# Patient Record
Sex: FEMALE | Race: OTHER | HISPANIC OR LATINO | Employment: FULL TIME | URBAN - METROPOLITAN AREA
[De-identification: names, ages, dates, MRNs, and addresses within clinical notes are randomized per-mention and may not be internally consistent; named-entity substitution may affect disease eponyms.]

---

## 2024-11-08 ENCOUNTER — ANCILLARY PROCEDURE (OUTPATIENT)
Dept: OBGYN | Facility: CLINIC | Age: 24
End: 2024-11-08

## 2024-11-08 DIAGNOSIS — N91.2 AMENORRHEA, UNSPECIFIED: ICD-10-CM

## 2024-11-08 PROCEDURE — 76801 OB US < 14 WKS SINGLE FETUS: CPT | Performed by: OBSTETRICS & GYNECOLOGY

## 2024-11-13 ENCOUNTER — HOSPITAL ENCOUNTER (OUTPATIENT)
Facility: MEDICAL CENTER | Age: 24
End: 2024-11-13
Payer: COMMERCIAL

## 2024-11-13 ENCOUNTER — HOSPITAL ENCOUNTER (OUTPATIENT)
Facility: MEDICAL CENTER | Age: 24
End: 2024-11-13
Payer: MEDICAID

## 2024-11-13 ENCOUNTER — INITIAL PRENATAL (OUTPATIENT)
Dept: OBGYN | Facility: CLINIC | Age: 24
End: 2024-11-13
Payer: MEDICAID

## 2024-11-13 ENCOUNTER — APPOINTMENT (OUTPATIENT)
Dept: OBGYN | Facility: CLINIC | Age: 24
End: 2024-11-13
Payer: MEDICAID

## 2024-11-13 VITALS — SYSTOLIC BLOOD PRESSURE: 90 MMHG | WEIGHT: 174 LBS | DIASTOLIC BLOOD PRESSURE: 62 MMHG

## 2024-11-13 DIAGNOSIS — Z34.81 ENCOUNTER FOR SUPERVISION OF OTHER NORMAL PREGNANCY, FIRST TRIMESTER: ICD-10-CM

## 2024-11-13 DIAGNOSIS — Z34.02 ENCOUNTER FOR SUPERVISION OF NORMAL FIRST PREGNANCY IN SECOND TRIMESTER: ICD-10-CM

## 2024-11-13 LAB
ABO GROUP BLD: NORMAL
BLD GP AB SCN SERPL QL: NORMAL
ERYTHROCYTE [DISTWIDTH] IN BLOOD BY AUTOMATED COUNT: 43.1 FL (ref 35.9–50)
HBV SURFACE AG SER QL: ABNORMAL
HCT VFR BLD AUTO: 38.1 % (ref 37–47)
HCV AB SER QL: ABNORMAL
HGB BLD-MCNC: 12 G/DL (ref 12–16)
HIV 1+2 AB+HIV1 P24 AG SERPL QL IA: NORMAL
MCH RBC QN AUTO: 27.6 PG (ref 27–33)
MCHC RBC AUTO-ENTMCNC: 31.5 G/DL (ref 32.2–35.5)
MCV RBC AUTO: 87.8 FL (ref 81.4–97.8)
PLATELET # BLD AUTO: 275 K/UL (ref 164–446)
PMV BLD AUTO: 11.5 FL (ref 9–12.9)
RBC # BLD AUTO: 4.34 M/UL (ref 4.2–5.4)
RH BLD: NORMAL
RUBV AB SER QL: 85.9 IU/ML
T PALLIDUM AB SER QL IA: ABNORMAL
WBC # BLD AUTO: 10.1 K/UL (ref 4.8–10.8)

## 2024-11-13 PROCEDURE — 87591 N.GONORRHOEAE DNA AMP PROB: CPT

## 2024-11-13 PROCEDURE — 87491 CHLMYD TRACH DNA AMP PROBE: CPT

## 2024-11-13 PROCEDURE — 90471 IMMUNIZATION ADMIN: CPT

## 2024-11-13 PROCEDURE — 90656 IIV3 VACC NO PRSV 0.5 ML IM: CPT

## 2024-11-13 PROCEDURE — 0500F INITIAL PRENATAL CARE VISIT: CPT

## 2024-11-13 ASSESSMENT — EDINBURGH POSTNATAL DEPRESSION SCALE (EPDS)
I HAVE BLAMED MYSELF UNNECESSARILY WHEN THINGS WENT WRONG: NO, NEVER
I HAVE LOOKED FORWARD WITH ENJOYMENT TO THINGS: AS MUCH AS I EVER DID
THE THOUGHT OF HARMING MYSELF HAS OCCURRED TO ME: NEVER
TOTAL SCORE: 4
I HAVE FELT SCARED OR PANICKY FOR NO GOOD REASON: NO, NOT MUCH
I HAVE FELT SAD OR MISERABLE: NO, NOT AT ALL
I HAVE BEEN ABLE TO LAUGH AND SEE THE FUNNY SIDE OF THINGS: AS MUCH AS I ALWAYS COULD
I HAVE BEEN SO UNHAPPY THAT I HAVE HAD DIFFICULTY SLEEPING: YES, SOMETIMES
I HAVE BEEN ANXIOUS OR WORRIED FOR NO GOOD REASON: NO, NOT AT ALL
I HAVE BEEN SO UNHAPPY THAT I HAVE BEEN CRYING: NO, NEVER
THINGS HAVE BEEN GETTING ON TOP OF ME: NO, MOST OF THE TIME I HAVE COPED QUITE WELL

## 2024-11-13 NOTE — PROGRESS NOTES
Risk factors:   none  Referrals made today:   none      Subjective:   Crystal Grewal is a 24 y.o.  who presents for her new OB exam. She is 13w6d with an DOUGLAS of Estimated Date of Delivery: 5/15/25 based off of LMP which was c/w an early US. This was unplanned and desired. She feels overall well with some nausea.    Her partner, Brijesh, is supportive. She lives with Brijesh and works at [employer]. Denies ER visits or previous care in this pregnancy. Denies any current or hx of sexual, emotional or physical abuse or trauma.     Pap was done this year in [place], normal per her reports.     ROS:  Denies weakness, fatigue, or malaise  Denies headache, changes in vision  Denies constipation, diarrhea  Denies dysuria  Denies changes in appetite; reports some nausea and vomiting in the mornings  Denies vaginal bleeding, leaking of fluid, discharge, irritation  Denies depression, anxiety  Denies cramping/contractions  Denies fetal movement    Not on File     History reviewed. No pertinent past medical history.  History of Varicella: no  History of HSV I or II in self or partner: no  History of Thyroid problems: no    OB History    Para Term  AB Living   1             SAB IAB Ectopic Molar Multiple Live Births                    # Outcome Date GA Lbr Pardeep/2nd Weight Sex Type Anes PTL Lv   1 Current                Family History   Problem Relation Age of Onset    Diabetes Father     Breast Cancer Maternal Aunt     Diabetes Paternal Grandmother     Diabetes Paternal Grandfather      denies hx family genetic conditions    Social History     Socioeconomic History    Marital status: Single     Spouse name: Not on file    Number of children: Not on file    Years of education: Not on file    Highest education level: Not on file   Occupational History    Not on file   Tobacco Use    Smoking status: Never    Smokeless tobacco: Never   Vaping Use    Vaping status: Never Used   Substance and Sexual Activity    Alcohol use:  Never    Drug use: Not Currently     Types: Marijuana    Sexual activity: Yes     Partners: Male     Birth control/protection: None   Other Topics Concern    Not on file   Social History Narrative    Not on file     Social Drivers of Health     Financial Resource Strain: Not on file   Food Insecurity: Not on file   Transportation Needs: Not on file   Physical Activity: Not on file   Stress: Not on file   Social Connections: Not on file   Intimate Partner Violence: Not on file   Housing Stability: Not on file     denies current smoking, alcohol use, illicit drug use    Objective:  BP 90/62   Wt 174 lb      FHTs: 145    Well nourished female in no acute distress  AAO x 3  Heart RRR  Lungs clear to auscultation bilaterally  No edema noted, pulses WNL bilaterally in lower extremities  BS x 4; no guarding or tenderness  Uterus: gravid    Assessment:        1.  IUP @ 13w6d per LMP, c/w early US        2.  S=D        3.  See problem list below       Patient Active Problem List    Diagnosis Date Noted    Encounter for supervision of normal first pregnancy in second trimester 11/13/2024       Plan:        1.  GC/CT done; pap UTD, though will repeat PP        2.  Prenatal labs ordered - lab slip given        3.  Encouraged PNV; diet with high protein snacks and limited sugar/sugary beverages, adequate water intake; and foods/medications/exposures to avoid        4.  NOB packet given        5.  Discussed carrier screening and genetic testing options; desires NIPT        5.  Centering referral discussed, pt accepted--referral sent to [facility] for scheduling        6.  Complete anatomy OB US in 6-7 wks        7.  Recommended 81mg aspirin daily        8.  Return to office in 4 wks    Orders Placed This Encounter    US-OB 2ND 3RD TRI COMPLETE    INFLUENZA VACCINE TRI INJ (PF)    PREG CNTR PRENATAL PN    URINE DRUG SCREEN W/CONF (AR)    PANORAMA PRENATAL TEST    Chlamydia/GC, PCR (Genital/Anal swab)    Prenatal Multivit-Min-Fe-FA  (PRENATAL 1 + IRON PO)         ANGIE RomeroM

## 2024-11-13 NOTE — PROGRESS NOTES
Patient here for NEW OB   LMP= 8/8/24 aprox  DOUGLAS= 5/15/25  GA= 13w6d  Last pap- per pt got it done in [place] 2024 WNL   Phone number: [phone] (home) [phone] (work)  Pharmacy verified  EPDS- 4  Genetic testing- NIPT/ AFP  FLU - given today

## 2024-11-13 NOTE — LETTER
November 13, 2024              Crystal Grewal is currently being cared for at [facility].  Her hours/activities need to be modified.  She should not lift over 20 pounds repetitively, needs 10 minute breaks every 2 hours and may use a chair to sit during her shift as needed.           Thank you,          Cate Pena C.N.M.    Electronically Signed

## 2024-11-14 LAB
C TRACH DNA GENITAL QL NAA+PROBE: NEGATIVE
N GONORRHOEA DNA GENITAL QL NAA+PROBE: NEGATIVE
SPECIMEN SOURCE: NORMAL

## 2024-11-15 LAB
AMPHET CTO UR CFM-MCNC: NEGATIVE NG/ML
BACTERIA UR CULT: NORMAL
BARBITURATES CTO UR CFM-MCNC: NEGATIVE NG/ML
BENZODIAZ CTO UR CFM-MCNC: NEGATIVE NG/ML
CANNABINOIDS CTO UR CFM-MCNC: NEGATIVE NG/ML
COCAINE CTO UR CFM-MCNC: NEGATIVE NG/ML
CREAT UR-MCNC: 125.7 MG/DL (ref 20–400)
DRUG COMMENT 753798: NORMAL
METHADONE CTO UR CFM-MCNC: NEGATIVE NG/ML
OPIATES CTO UR CFM-MCNC: NEGATIVE NG/ML
PCP CTO UR CFM-MCNC: NEGATIVE NG/ML
PROPOXYPH CTO UR CFM-MCNC: NEGATIVE NG/ML
SIGNIFICANT IND 70042: NORMAL
SITE SITE: NORMAL
SOURCE SOURCE: NORMAL

## 2024-12-05 ENCOUNTER — HOSPITAL ENCOUNTER (OUTPATIENT)
Dept: LAB | Facility: MEDICAL CENTER | Age: 24
End: 2024-12-05
Payer: MEDICAID

## 2024-12-05 PROCEDURE — 36415 COLL VENOUS BLD VENIPUNCTURE: CPT

## 2024-12-11 ENCOUNTER — HOSPITAL ENCOUNTER (OUTPATIENT)
Dept: LAB | Facility: MEDICAL CENTER | Age: 24
End: 2024-12-11
Payer: MEDICAID

## 2024-12-11 ENCOUNTER — HOSPITAL ENCOUNTER (OUTPATIENT)
Facility: MEDICAL CENTER | Age: 24
End: 2024-12-11
Payer: MEDICAID

## 2024-12-11 ENCOUNTER — ROUTINE PRENATAL (OUTPATIENT)
Dept: OBGYN | Facility: CLINIC | Age: 24
End: 2024-12-11
Payer: MEDICAID

## 2024-12-11 VITALS — DIASTOLIC BLOOD PRESSURE: 80 MMHG | WEIGHT: 178.8 LBS | SYSTOLIC BLOOD PRESSURE: 100 MMHG

## 2024-12-11 DIAGNOSIS — Z34.02 ENCOUNTER FOR SUPERVISION OF NORMAL FIRST PREGNANCY IN SECOND TRIMESTER: ICD-10-CM

## 2024-12-11 DIAGNOSIS — Z34.02 ENCOUNTER FOR SUPERVISION OF NORMAL FIRST PREGNANCY IN SECOND TRIMESTER: Primary | ICD-10-CM

## 2024-12-11 PROCEDURE — 3074F SYST BP LT 130 MM HG: CPT

## 2024-12-11 PROCEDURE — 87510 GARDNER VAG DNA DIR PROBE: CPT

## 2024-12-11 PROCEDURE — 3079F DIAST BP 80-89 MM HG: CPT

## 2024-12-11 PROCEDURE — 87660 TRICHOMONAS VAGIN DIR PROBE: CPT

## 2024-12-11 PROCEDURE — 36415 COLL VENOUS BLD VENIPUNCTURE: CPT

## 2024-12-11 PROCEDURE — 0502F SUBSEQUENT PRENATAL CARE: CPT

## 2024-12-11 PROCEDURE — 81511 FTL CGEN ABNOR FOUR ANAL: CPT

## 2024-12-11 PROCEDURE — 87480 CANDIDA DNA DIR PROBE: CPT

## 2024-12-11 NOTE — ASSESSMENT & PLAN NOTE
Pt is a 24 y.o.  at 17w6d gestation here today for routine prenatal care.   Size equal to dates    Discuss 2nd trimester warning signs  Address concerns: vag path collected  AFP tetra ordered. NIPT performed earlier this pregnancy.  Anatomy scan scheduled   RTC 4 wks

## 2024-12-11 NOTE — PROGRESS NOTES
S: Pt is a 24 y.o.  at 17w6d gestation here today for routine prenatal care.     Concerns today include:   increased white vaginal delivery    Denies: vaginal bleeding, pelvic and abdominal pain, cramping, contractions, leaking of fluid, urinary and vaginal symptoms and headaches, visual changes, epigastric pain    Pt reports fetal movement as Present     O: /80   Wt 178 lb 12.8 oz   LMP 2024 (Approximate)    *see prenatal flowsheet*     Labs:     Prenatal labs: Completed and normal  GCT: not yet collected   GBS: Not yet collected  Genetic testing: not yet collected  STI testing: negative    Ultrasound: scheduled     A/P:     Problem List Items Addressed This Visit       Encounter for supervision of normal first pregnancy in second trimester - Primary     Pt is a 24 y.o.  at 17w6d gestation here today for routine prenatal care.   Size equal to dates    Discuss 2nd trimester warning signs  Address concerns: vag path collected  AFP tetra ordered. NIPT performed earlier this pregnancy.  Anatomy scan scheduled   RTC 4 wks          Relevant Orders    AFP TETRA    VAGINAL PATHOGENS DNA PANEL

## 2024-12-11 NOTE — PROGRESS NOTES
Pt here today for OBFV   +FM movemnet  No VB, LOF. Uc's   Phone number [phone]  Pharmacy verified   White discharge with no odor 2 wks

## 2024-12-12 LAB
CANDIDA DNA VAG QL PROBE+SIG AMP: NEGATIVE
G VAGINALIS DNA VAG QL PROBE+SIG AMP: POSITIVE
T VAGINALIS DNA VAG QL PROBE+SIG AMP: NEGATIVE

## 2024-12-13 LAB
# FETUSES US: NORMAL
AFP MOM SERPL: 0.61
AFP SERPL-MCNC: 24 NG/ML
AGE - REPORTED: 24.7 YR
CURRENT SMOKER: NO
FAMILY MEMBER DISEASES HX: NO
GA METHOD: NORMAL
GA: NORMAL WK
HCG MOM SERPL: 1.04
HCG SERPL-ACNC: NORMAL IU/L
HX OF HEREDITARY DISORDERS: NO
IDDM PATIENT QL: NO
INHIBIN A MOM SERPL: 1.04
INHIBIN A SERPL-MCNC: 163 PG/ML
INTEGRATED SCN PATIENT-IMP: NORMAL
PATHOLOGY STUDY: NORMAL
SPECIMEN DRAWN SERPL: NORMAL
U ESTRIOL MOM SERPL: 0.78
U ESTRIOL SERPL-MCNC: 1.63 NG/ML

## 2024-12-13 RX ORDER — METRONIDAZOLE 500 MG/1
500 TABLET ORAL 2 TIMES DAILY
Qty: 14 TABLET | Refills: 0 | Status: SHIPPED | OUTPATIENT
Start: 2024-12-13

## 2024-12-14 LAB
Lab: NORMAL
NTRA 1P36 DELETION SYNDROME POPULATION-BASED RISK TEXT: NORMAL
NTRA 1P36 DELETION SYNDROME RESULT TEXT: NORMAL
NTRA 1P36 DELETION SYNDROME RISK SCORE TEXT: NORMAL
NTRA 22Q11.2 DELETION SYNDROME POPULATION-BASED RISK TEXT: NORMAL
NTRA 22Q11.2 DELETION SYNDROME RESULT TEXT: NORMAL
NTRA 22Q11.2 DELETION SYNDROME RISK SCORE TEXT: NORMAL
NTRA ANGELMAN SYNDROME POPULATION-BASED RISK TEXT: NORMAL
NTRA ANGELMAN SYNDROME RESULT TEXT: NORMAL
NTRA ANGELMAN SYNDROME RISK SCORE TEXT: NORMAL
NTRA CRI-DU-CHAT SYNDROME POPULATION-BASED RISK TEXT: NORMAL
NTRA CRI-DU-CHAT SYNDROME RESULT TEXT: NORMAL
NTRA CRI-DU-CHAT SYNDROME RISK SCORE TEXT: NORMAL
NTRA FETAL FRACTION: NORMAL
NTRA GENDER OF FETUS: NORMAL
NTRA MONOSOMY X AGE-BASED RISK TEXT: NORMAL
NTRA MONOSOMY X RESULT TEXT: NORMAL
NTRA MONOSOMY X RISK SCORE TEXT: NORMAL
NTRA PRADER-WILLI SYNDROME POPULATION-BASED RISK TEXT: NORMAL
NTRA PRADER-WILLI SYNDROME RESULT TEXT: NORMAL
NTRA PRADER-WILLI SYNDROME RISK SCORE TEXT: NORMAL
NTRA TRIPLOIDY RESULT TEXT: NORMAL
NTRA TRISOMY 13 AGE-BASED RISK TEXT: NORMAL
NTRA TRISOMY 13 RESULT TEXT: NORMAL
NTRA TRISOMY 13 RISK SCORE TEXT: NORMAL
NTRA TRISOMY 18 AGE-BASED RISK TEXT: NORMAL
NTRA TRISOMY 18 RESULT TEXT: NORMAL
NTRA TRISOMY 18 RISK SCORE TEXT: NORMAL
NTRA TRISOMY 21 AGE-BASED RISK TEXT: NORMAL
NTRA TRISOMY 21 RESULT TEXT: NORMAL
NTRA TRISOMY 21 RISK SCORE TEXT: NORMAL

## 2024-12-16 ENCOUNTER — TELEPHONE (OUTPATIENT)
Dept: OBGYN | Facility: CLINIC | Age: 24
End: 2024-12-16
Payer: MEDICAID

## 2024-12-16 NOTE — TELEPHONE ENCOUNTER
----- Message from Nurse Practitioner VIRGINIA Sheikh sent at 12/13/2024  1:30 AM PST -----  Please let the patient know that she is positive for bacterial vaginosis. This is not an STI but an imbalance of the bacteria in her vagina.     Discuss prevention of vaginal infections such as avoiding scented products on or around vagina, switching to different condoms if used, avoid douching, and to use different razor each time if shaving pubic hair and or change to waxing instead.     I sent antibiotics to her pharmacy     If symptoms do not resolve within 1 week after completion of treatment, patient to return for follow up visit.     Thanks!     Called pt and informed her test came back positive for BV, explained this is not an STI. Pt informed as above. Pt informed she needs to start antibiotics. Should take the full Tx and advised to take meds with food but not with milk and to avoid alcohol, sun exposure and intercourse while on Tx. Pharmacy verified with pt. Pt agreed and verbalized understanding.       Called pt and informed her test came back positive for BV, explained this is not an STI. Pt informed she needs to start antibiotics. Should take the full Tx and advised to take meds with food but not with milk and to avoid alcohol and intercourse while on Tx. Pharmacy verified with pt. Pt agreed and verbalized understanding.

## 2025-01-10 ENCOUNTER — ROUTINE PRENATAL (OUTPATIENT)
Dept: OBGYN | Facility: CLINIC | Age: 25
End: 2025-01-10
Payer: MEDICAID

## 2025-01-10 VITALS — SYSTOLIC BLOOD PRESSURE: 100 MMHG | WEIGHT: 184 LBS | DIASTOLIC BLOOD PRESSURE: 60 MMHG

## 2025-01-10 DIAGNOSIS — Z34.02 ENCOUNTER FOR SUPERVISION OF NORMAL FIRST PREGNANCY IN SECOND TRIMESTER: ICD-10-CM

## 2025-01-10 PROCEDURE — 3078F DIAST BP <80 MM HG: CPT

## 2025-01-10 PROCEDURE — 0502F SUBSEQUENT PRENATAL CARE: CPT

## 2025-01-10 PROCEDURE — 3074F SYST BP LT 130 MM HG: CPT

## 2025-01-10 RX ORDER — METRONIDAZOLE 7.5 MG/G
1 GEL VAGINAL
Qty: 70 G | Refills: 0 | Status: SHIPPED | OUTPATIENT
Start: 2025-01-10

## 2025-01-10 NOTE — PROGRESS NOTES
Pt here today for OBFV   +FM movemnet  No VB, LOF. Uc's   Phone number /Pharmacy verified   US 1/14

## 2025-01-10 NOTE — LETTER
January 10, 2025    To Whom It May Concern:         This is confirmation that Crystal Grewal attended her scheduled appointment with Cate Pena C.N.M. on 1/10/25. She is currently pregnant and will be full term in late April/early May.          If you have any questions please do not hesitate to call me at the phone number listed below.    Sincerely,          Cate Pena C.N.M.  [phone]

## 2025-01-11 NOTE — PROGRESS NOTES
S: Pt is a 24 y.o.  at 22w1d gestation here today for routine prenatal care. Pt reports fetal movement; denies cramping, vaginal bleeding, and leaking of fluid. Reports that she has still had some vaginal discharge after taking medication prescribed at her last visit, would like to use a vaginal cream instead.       O: /60   Wt 184 lb    *see prenatal flowsheet*     A: IUP at 22w1d       S=D         Patient Active Problem List    Diagnosis Date Noted    Encounter for supervision of normal first pregnancy in second trimester 2024       P:   -Discussed normal s/sx pregnancy appropriate for gestational age general discomforts vs warning signs that necessitate evaluation in OB triage. Reviewed fetal movements appropriate for EGA. Reinforced adequate hydration and nutrition.  -Rx for metrogel written  -Has anatomy scan scheduled  -NIPT low-risk  -RTC in 4 weeks for routine prenatal care      Cate Pena C.N.M.

## 2025-01-14 ENCOUNTER — ANCILLARY PROCEDURE (OUTPATIENT)
Dept: OBGYN | Facility: CLINIC | Age: 25
End: 2025-01-14
Payer: MEDICAID

## 2025-01-14 VITALS — HEART RATE: 69 BPM | SYSTOLIC BLOOD PRESSURE: 109 MMHG | WEIGHT: 180.4 LBS | DIASTOLIC BLOOD PRESSURE: 62 MMHG

## 2025-01-14 DIAGNOSIS — Z34.02 ENCOUNTER FOR SUPERVISION OF NORMAL FIRST PREGNANCY IN SECOND TRIMESTER: ICD-10-CM

## 2025-01-14 PROCEDURE — 76805 OB US >/= 14 WKS SNGL FETUS: CPT | Performed by: OBSTETRICS & GYNECOLOGY

## 2025-01-14 PROCEDURE — 76817 TRANSVAGINAL US OBSTETRIC: CPT | Performed by: OBSTETRICS & GYNECOLOGY

## 2025-02-05 ENCOUNTER — ROUTINE PRENATAL (OUTPATIENT)
Dept: OBGYN | Facility: CLINIC | Age: 25
End: 2025-02-05
Payer: MEDICAID

## 2025-02-05 VITALS — DIASTOLIC BLOOD PRESSURE: 60 MMHG | WEIGHT: 190 LBS | SYSTOLIC BLOOD PRESSURE: 100 MMHG

## 2025-02-05 DIAGNOSIS — Z34.02 ENCOUNTER FOR SUPERVISION OF NORMAL FIRST PREGNANCY IN SECOND TRIMESTER: ICD-10-CM

## 2025-02-05 PROCEDURE — 0502F SUBSEQUENT PRENATAL CARE: CPT

## 2025-02-05 PROCEDURE — 3074F SYST BP LT 130 MM HG: CPT

## 2025-02-05 PROCEDURE — 3078F DIAST BP <80 MM HG: CPT

## 2025-02-05 NOTE — PROGRESS NOTES
"S: Pt is a 24 y.o.  at 25w6d gestation here today for routine prenatal care. Pt reports fetal movement; denies vaginal bleeding, and leaking of fluid. Reports \"feeling dilated\" d/t lower pelvic pain. Denies regular strong contractions.       O: /60   Wt 190 lb    *see prenatal flowsheet*     A: IUP at 25w6d       S=D         Patient Active Problem List    Diagnosis Date Noted    Encounter for supervision of normal first pregnancy in second trimester 2024       P:   -Discussed normal s/sx pregnancy appropriate for gestational age general discomforts vs warning signs that necessitate evaluation in OB triage. Reviewed fetal movements appropriate for EGA. Reinforced adequate hydration and nutrition.  -Discussed RLP versus strong uterine contractions. Discussed relief measures. Will also check cervical length on US.   -Reviewed anatomy scan WNL  -RTC in 3 weeks for routine prenatal care      Cate Pena C.N.M.    "

## 2025-02-05 NOTE — LETTER
Crystal Greawl was seen and treated in our clinic on 2/5/2025. She was sick with an upper respiratory infection from 1/20-1/25. Please excuse her absence from work during this time.    Please do not hesitate to reach out if you have any questions,        Cate Pena CNM

## 2025-02-05 NOTE — PROGRESS NOTES
Pt here today for OBFV   +FM movemnet  No VB, LOF. Uc's   Phone number /Pharmacy verified   3rd trimester lab orders pended and instructions given to patient

## 2025-02-05 NOTE — LETTER
February 5, 2025              Crystal Grewal is currently being cared for at [facility].  Her hours/activities need to be modified.  She should not lift over 8 pounds repetitively, and should have at least a 10 minutes break every two hours. A position with less standing time would be appropriate during her pregnancy.           Thank you,          Cate Pena C.N.M.    Electronically Signed

## 2025-02-14 ENCOUNTER — HOSPITAL ENCOUNTER (OUTPATIENT)
Dept: LAB | Facility: MEDICAL CENTER | Age: 25
End: 2025-02-14
Payer: MEDICAID

## 2025-02-14 DIAGNOSIS — Z34.02 ENCOUNTER FOR SUPERVISION OF NORMAL FIRST PREGNANCY IN SECOND TRIMESTER: ICD-10-CM

## 2025-02-14 LAB
ERYTHROCYTE [DISTWIDTH] IN BLOOD BY AUTOMATED COUNT: 44.8 FL (ref 35.9–50)
GLUCOSE 1H P 50 G GLC PO SERPL-MCNC: 89 MG/DL (ref 70–139)
HCT VFR BLD AUTO: 37 % (ref 37–47)
HGB BLD-MCNC: 11.7 G/DL (ref 12–16)
MCH RBC QN AUTO: 28.6 PG (ref 27–33)
MCHC RBC AUTO-ENTMCNC: 31.6 G/DL (ref 32.2–35.5)
MCV RBC AUTO: 90.5 FL (ref 81.4–97.8)
PLATELET # BLD AUTO: 298 K/UL (ref 164–446)
PMV BLD AUTO: 11.3 FL (ref 9–12.9)
RBC # BLD AUTO: 4.09 M/UL (ref 4.2–5.4)
T PALLIDUM AB SER QL IA: NORMAL
WBC # BLD AUTO: 11.9 K/UL (ref 4.8–10.8)

## 2025-02-14 PROCEDURE — 36415 COLL VENOUS BLD VENIPUNCTURE: CPT

## 2025-02-14 PROCEDURE — 82950 GLUCOSE TEST: CPT

## 2025-02-14 PROCEDURE — 85027 COMPLETE CBC AUTOMATED: CPT

## 2025-02-14 PROCEDURE — 86780 TREPONEMA PALLIDUM: CPT

## 2025-02-26 ENCOUNTER — ROUTINE PRENATAL (OUTPATIENT)
Dept: OBGYN | Facility: CLINIC | Age: 25
End: 2025-02-26
Payer: MEDICAID

## 2025-02-26 VITALS — WEIGHT: 191 LBS | SYSTOLIC BLOOD PRESSURE: 110 MMHG | DIASTOLIC BLOOD PRESSURE: 60 MMHG

## 2025-02-26 DIAGNOSIS — Z34.03 ENCOUNTER FOR SUPERVISION OF NORMAL FIRST PREGNANCY IN THIRD TRIMESTER: ICD-10-CM

## 2025-02-26 NOTE — PROGRESS NOTES
S: Pt is a 24 y.o.  at 28w6d gestation here today for routine prenatal care. Pt reports fetal movement; denies cramping, vaginal bleeding, and leaking of fluid. Would like to disc BC today for PP.      O: /60   Wt 191 lb    *see prenatal flowsheet*     A: IUP at 28w5d       S=D         Patient Active Problem List    Diagnosis Date Noted    Encounter for supervision of normal first pregnancy in third trimester 2024       P:   -Discussed normal s/sx pregnancy appropriate for gestational age general discomforts vs warning signs that necessitate evaluation in OB triage. Reviewed fetal movements appropriate for EGA. Reinforced adequate hydration and nutrition.  -Disc BC options, would like IUD--most liekly hormonal, though maybe non-hormonal  -Reviewed 3T labs, WNL  -Disc PTL precautions, FKC  -Tdap given today   -RTC in 2 weeks for routine prenatal care      Cate Pena C.N.M.

## 2025-02-26 NOTE — LETTER
Cuente los Movimientos de morrow Bebé  Otro paso importante para la susan de morrow bebé    Crystal Grewal     [facility]            Dept: [phone]    ¿Cuántas semanas tiene de embarazo? 28w6d    Fecha cuando tiene que comenzar a contar el movimiento: HOY                  Patricia debe usar ángel diagrama    Tanja manera en que morrow doctor puede controlar a susan de morrow bebé es sabiendo cuantas veces se mueve morrow bebé en el útero, o por medio de las “pataditas”.  Usted podrá ayudarle a morrow médico al usar cada día el siguiente diagrama.    Cada día, usted debe prestar atención a cuantas horas le lleva a morrow bebé moverse 10 veces.  Comience a contar en la mañana, lo antes posible después de haberse levantado.    Primeramente, escriba la hora en que se mueve morrow bebé, hasta llegar a 10 veces.  Colóquele un check o palomita a cada cuadrito cada vez que morrow bebé se mueva hasta que complete 10 veces.  Escriba la hora cuando termine de contar 10 veces en la última columna.  Sume el total del tiempo que le llevó contar los 10 movimientos.  Finalmente, complete el cuadrito de cuantas horas le llevó hacerlo.    Después de héctor contado los 10 movimientos, ya no tendrá que contar los demás movimientos por el miguel del día.  A la mañana siguiente, comience a contar de nuevo cuantas veces se mueve el bebé desde el momento en que se levante.    ¿Qué tendría que considerarse un “movimiento”?  Es difícil de decirlo porque es distinto de tanja madre a otra, y de un embarazo a otro.  Lo importante es que cuente el movimiento de la misma manera herbie el transcurso de morrow embarazo.  Si tiene preguntas adicionales, pregúntele a morrow doctor.    ¡Cuente cuidadosamente cada día!     MUESTRA:  Semana 28    ¿Cuántas horas le ha llevado sentir 10 movimientos?        Hora de Inicio     1     2     3     4     5     6     7     8     9     10   Hora de Finlizar   Hyacinth. 8:20           11:40   Mar.               Mié.                Jue.               Vie.               Sáb.               Dom.                 IMPORTANTE:  Usted debe contactar a morrow doctor si le lleva más de 2 horas sentir 10 movimientos de morrow bebé.    Cada mañana, escriba la hora de inicio y comience a contar los movimientos de morrow bebé.  Hágalo colocándole un check o palomita a cada cuadrito cada vez que sienta un movimiento de morrow bebé.  Cuando haya sentido 10 “pataditas”, escriba la hora en que terminó de contar en la última columna.  Luego, complete en la cajita (arriba de la claudia de check o palomita) el número total de horas que le llevó hacerlo.  Asegúrese de leer completamente las instrucciones en la página anterior.

## 2025-02-26 NOTE — PROGRESS NOTES
Pt here today for OBFV   +FM movemnet  No VB, LOF. Uc's   Phone number /Pharmacy verified   US 3/6   FABY given to pt  TDAP- given today   BTL- declined

## 2025-03-06 ENCOUNTER — ANCILLARY PROCEDURE (OUTPATIENT)
Dept: OBGYN | Facility: CLINIC | Age: 25
End: 2025-03-06
Payer: MEDICAID

## 2025-03-06 VITALS — WEIGHT: 196.6 LBS | HEART RATE: 80 BPM | SYSTOLIC BLOOD PRESSURE: 98 MMHG | DIASTOLIC BLOOD PRESSURE: 57 MMHG

## 2025-03-06 DIAGNOSIS — Z34.02 ENCOUNTER FOR SUPERVISION OF NORMAL FIRST PREGNANCY IN SECOND TRIMESTER: ICD-10-CM

## 2025-03-06 DIAGNOSIS — O36.5931 IUGR (INTRAUTERINE GROWTH RESTRICTION) AFFECTING CARE OF MOTHER, THIRD TRIMESTER, FETUS 1: ICD-10-CM

## 2025-03-06 PROCEDURE — 76816 OB US FOLLOW-UP PER FETUS: CPT | Performed by: OBSTETRICS & GYNECOLOGY

## 2025-03-06 PROCEDURE — 76820 UMBILICAL ARTERY ECHO: CPT | Performed by: OBSTETRICS & GYNECOLOGY

## 2025-03-11 ENCOUNTER — ROUTINE PRENATAL (OUTPATIENT)
Dept: OBGYN | Facility: CLINIC | Age: 25
End: 2025-03-11
Payer: MEDICAID

## 2025-03-11 VITALS — DIASTOLIC BLOOD PRESSURE: 62 MMHG | WEIGHT: 196 LBS | SYSTOLIC BLOOD PRESSURE: 104 MMHG

## 2025-03-11 DIAGNOSIS — Z34.03 ENCOUNTER FOR SUPERVISION OF NORMAL FIRST PREGNANCY IN THIRD TRIMESTER: ICD-10-CM

## 2025-03-11 PROBLEM — O36.5920 POOR FETAL GROWTH AFFECTING MANAGEMENT OF MOTHER IN SECOND TRIMESTER: Status: ACTIVE | Noted: 2025-03-11

## 2025-03-11 PROCEDURE — 3078F DIAST BP <80 MM HG: CPT | Performed by: NURSE PRACTITIONER

## 2025-03-11 PROCEDURE — 0502F SUBSEQUENT PRENATAL CARE: CPT | Performed by: NURSE PRACTITIONER

## 2025-03-11 PROCEDURE — 3074F SYST BP LT 130 MM HG: CPT | Performed by: NURSE PRACTITIONER

## 2025-03-11 NOTE — PROGRESS NOTES
S:  Pt is  at 30w5d for routine OB follow up.  No concerns today. No ED or hospital visits since last seen. Reports good FM.  Denies VB, LOF, RUCs or vaginal DC.    O:  Please see above vitals.        1hr GTT: 89         H/H: 11.7/37.0  -- reviewed w pt.    A:  IUP at 30w5d  Patient Active Problem List    Diagnosis Date Noted    Encounter for supervision of normal first pregnancy in third trimester 2024        P:  1.  PP contraception: IUD.        2.  Continue FKCs.          3.  Questions answered.          4.  Encouraged pt to tour L&D.          5.  Encourage adequate water intake.        6.  F/u 2 wks.        7.  Appt for US on 4/3/2025

## 2025-03-11 NOTE — PROGRESS NOTES
OB follow up   + fetal movement.  No VB, LOF or UC's.  Phone # [phone]  Preferred pharmacy confirmed.  MFM 4/3/25

## 2025-03-31 ENCOUNTER — ROUTINE PRENATAL (OUTPATIENT)
Dept: OBGYN | Facility: CLINIC | Age: 25
End: 2025-03-31
Payer: MEDICAID

## 2025-03-31 VITALS — DIASTOLIC BLOOD PRESSURE: 52 MMHG | SYSTOLIC BLOOD PRESSURE: 98 MMHG | WEIGHT: 200 LBS

## 2025-03-31 DIAGNOSIS — O36.5930 POOR FETAL GROWTH AFFECTING MANAGEMENT OF MOTHER IN THIRD TRIMESTER, SINGLE OR UNSPECIFIED FETUS: ICD-10-CM

## 2025-03-31 NOTE — PROGRESS NOTES
S:  Pt is  at 33w4d for routine OB follow up.  No concerns today. No ED or hospital visits since last seen. Reports good FM.  Denies VB, LOF, RUCs or vaginal DC.    O:  Please see above vitals.        Growth US on 3/16/2025:        EFW was in 9%, AC was in 10% femur and humerus in 3% and 4%. DOTTIE 13.1. Doppler was WNL.     Indication: FGR    Reactive NST per my read: baseline 135, moderate variability, accels to 155, 2 accels in 20 min, no decels. NO UA           A:  IUP at 33w4d  Patient Active Problem List    Diagnosis Date Noted    Poor fetal growth affecting management of mother in third trimester 2025    Encounter for supervision of normal first pregnancy in third trimester 2024        P:  1.  GBS @ 36 wks.          2.  Continue FKCs.          3.  Questions answered.          4.  Encouraged pt to tour L&D.          5.  Encourage adequate water intake.        6.  Discussed childbirth education, discussed carseat safety, WIC information given        7. Has US appt for growth and dopplers on 4/3/2025        8. Needs twice weekly NSTs        9. F/u 2 wks.

## 2025-03-31 NOTE — PROGRESS NOTES
OB follow up   + fetal movement.  No VB, LOF or UC's.  Phone # [phone]  Preferred pharmacy confirmed.   US appt 4/3/25

## 2025-04-03 ENCOUNTER — ANCILLARY PROCEDURE (OUTPATIENT)
Dept: OBGYN | Facility: CLINIC | Age: 25
End: 2025-04-03
Payer: MEDICAID

## 2025-04-03 VITALS — SYSTOLIC BLOOD PRESSURE: 98 MMHG | WEIGHT: 198.2 LBS | HEART RATE: 80 BPM | DIASTOLIC BLOOD PRESSURE: 58 MMHG

## 2025-04-03 DIAGNOSIS — O36.5990 IUGR (INTRAUTERINE GROWTH RESTRICTION) AFFECTING CARE OF MOTHER, UNSPECIFIED TRIMESTER, NOT APPLICABLE OR UNSPECIFIED FETUS: ICD-10-CM

## 2025-04-03 PROCEDURE — 76816 OB US FOLLOW-UP PER FETUS: CPT | Performed by: OBSTETRICS & GYNECOLOGY

## 2025-04-07 ENCOUNTER — ROUTINE PRENATAL (OUTPATIENT)
Dept: OBGYN | Facility: CLINIC | Age: 25
End: 2025-04-07
Payer: MEDICAID

## 2025-04-07 VITALS — SYSTOLIC BLOOD PRESSURE: 100 MMHG | WEIGHT: 201 LBS | DIASTOLIC BLOOD PRESSURE: 60 MMHG

## 2025-04-07 DIAGNOSIS — O36.5930 POOR FETAL GROWTH AFFECTING MANAGEMENT OF MOTHER IN THIRD TRIMESTER, SINGLE OR UNSPECIFIED FETUS: ICD-10-CM

## 2025-04-07 PROCEDURE — 3074F SYST BP LT 130 MM HG: CPT

## 2025-04-07 PROCEDURE — 3078F DIAST BP <80 MM HG: CPT

## 2025-04-07 PROCEDURE — 0502F SUBSEQUENT PRENATAL CARE: CPT

## 2025-04-07 NOTE — PROGRESS NOTES
Pt here today for OBFV   +FM movemnet  No VB, LOF. Uc's   Phone number /Pharmacy verified   US 4/25

## 2025-04-07 NOTE — PROGRESS NOTES
S: Pt is a 24 y.o.  at 34w4d gestation here today for routine prenatal care. Pt reports fetal movement; denies cramping, vaginal bleeding, and leaking of fluid. Denies questions today.       O: /60   Wt 201 lb    *see prenatal flowsheet*     A: IUP at 34w4d       S=D         Patient Active Problem List    Diagnosis Date Noted    Poor fetal growth affecting management of mother in third trimester (see note) 2025    Encounter for supervision of normal first pregnancy in third trimester 2024       P:   -Discussed normal s/sx pregnancy appropriate for gestational age general discomforts vs warning signs that necessitate evaluation in OB triage. Reviewed fetal movements appropriate for EGA. Reinforced adequate hydration and nutrition.  -Discussed signs and symptoms of preeclampsia, including HA that is not resolved by rest/Tylenol/hydration, changes in vision, RUQ pain, or sudden increase in swelling.   -Discussed  labor precautions, how to perform fetal kick counts, and when to report to OB triage for evaluation.   -GBS at NV  -Disc options for pain management   -Has US f/u scheduled with MFM d/t borderline IUGR  -RTC in 2 weeks for routine prenatal care      Cate Pena C.N.M.

## 2025-04-21 ENCOUNTER — ROUTINE PRENATAL (OUTPATIENT)
Dept: OBGYN | Facility: CLINIC | Age: 25
End: 2025-04-21
Payer: MEDICAID

## 2025-04-21 ENCOUNTER — HOSPITAL ENCOUNTER (OUTPATIENT)
Facility: MEDICAL CENTER | Age: 25
End: 2025-04-21
Payer: MEDICAID

## 2025-04-21 VITALS — SYSTOLIC BLOOD PRESSURE: 110 MMHG | WEIGHT: 205 LBS | DIASTOLIC BLOOD PRESSURE: 70 MMHG

## 2025-04-21 DIAGNOSIS — Z34.03 ENCOUNTER FOR SUPERVISION OF NORMAL FIRST PREGNANCY IN THIRD TRIMESTER: ICD-10-CM

## 2025-04-21 PROCEDURE — 87150 DNA/RNA AMPLIFIED PROBE: CPT

## 2025-04-21 PROCEDURE — 87081 CULTURE SCREEN ONLY: CPT

## 2025-04-21 PROCEDURE — 0502F SUBSEQUENT PRENATAL CARE: CPT

## 2025-04-21 PROCEDURE — 3078F DIAST BP <80 MM HG: CPT

## 2025-04-21 PROCEDURE — 3074F SYST BP LT 130 MM HG: CPT

## 2025-04-21 NOTE — PROGRESS NOTES
S: Pt is a 24 y.o.  at 36w4d gestation here today for routine prenatal care. Pt reports fetal movement; denies cramping, vaginal bleeding, and leaking of fluid. Denies concerns today.      O: /70   Wt 205 lb    *see prenatal flowsheet*     A: IUP at 36w4d       S=D         Patient Active Problem List    Diagnosis Date Noted    Poor fetal growth affecting management of mother in third trimester (see note) 2025    Encounter for supervision of normal first pregnancy in third trimester 2024       P:   -Discussed normal s/sx pregnancy appropriate for gestational age general discomforts vs warning signs that necessitate evaluation in OB triage. Reviewed fetal movements appropriate for EGA. Reinforced adequate hydration and nutrition.  -Disc 5-1-1 rule for labor, what to expect with an induction if indicated  -Discussed  labor precautions, how to perform fetal kick counts, and when to report to OB triage for evaluation.   -GBS collected today  -Disc breastfeeding, given information  -Pt has US scheduled --will determine delivery recommendations at that visit if delivery indicated early d/t growth  -RTC in 1 weeks for routine prenatal care      Cate Pena C.N.M.

## 2025-04-21 NOTE — PROGRESS NOTES
Pt here today for OBFV   +FM movemnet  No VB, LOF. Uc's   Phone number /Pharmacy verified   GBS- today    4/25

## 2025-04-22 LAB — GP B STREP DNA SPEC QL NAA+PROBE: NEGATIVE

## 2025-04-25 ENCOUNTER — ANCILLARY PROCEDURE (OUTPATIENT)
Dept: OBGYN | Facility: CLINIC | Age: 25
End: 2025-04-25
Payer: MEDICAID

## 2025-04-25 DIAGNOSIS — O36.5990 IUGR (INTRAUTERINE GROWTH RESTRICTION) AFFECTING CARE OF MOTHER, UNSPECIFIED TRIMESTER, NOT APPLICABLE OR UNSPECIFIED FETUS: ICD-10-CM

## 2025-04-25 PROCEDURE — 76816 OB US FOLLOW-UP PER FETUS: CPT | Performed by: OBSTETRICS & GYNECOLOGY

## 2025-04-28 ENCOUNTER — ROUTINE PRENATAL (OUTPATIENT)
Dept: OBGYN | Facility: CLINIC | Age: 25
End: 2025-04-28
Payer: MEDICAID

## 2025-04-28 VITALS — DIASTOLIC BLOOD PRESSURE: 72 MMHG | WEIGHT: 206 LBS | SYSTOLIC BLOOD PRESSURE: 110 MMHG

## 2025-04-28 DIAGNOSIS — Z34.03 ENCOUNTER FOR SUPERVISION OF NORMAL FIRST PREGNANCY IN THIRD TRIMESTER: ICD-10-CM

## 2025-04-28 DIAGNOSIS — O36.5930 POOR FETAL GROWTH AFFECTING MANAGEMENT OF MOTHER IN THIRD TRIMESTER, SINGLE OR UNSPECIFIED FETUS: ICD-10-CM

## 2025-04-28 NOTE — PROGRESS NOTES
Pt here for a OB follow up   GA: 37w 4d    Pt states: no questions   + fetal movement.  No VB, LOF, UC's.  Phone # [phone]  Pharmacy Verified.  Tdap : 02/26/25  Ultrasound : 04/25/25 growth   Labs : done

## 2025-04-28 NOTE — PROGRESS NOTES
S: Pt is a 24 y.o.  at 37w4d gestation here today for routine prenatal care.     Concerns today include:  Denies concerns    Denies: vaginal bleeding, pelvic and abdominal pain, cramping, contractions, leaking of fluid, urinary and vaginal symptoms and headaches, visual changes, epigastric pain    Pt reports fetal movement as Present     O: /72   Wt 206 lb   LMP 2024 (Approximate)    *see prenatal flowsheet*     US-OB LIMITED GROWTH FOLLOW UP  Narrative: Indication  ===========    Indication:  Screening for IUGR  Indication: Supervision of Normal First Pregnancy  Fetal Growth Overview  ======================    Exam date     GA        BPD (mm)       HC (mm)          AC (mm)         FL (mm)        HL (mm)       EFW (g)  2025    22w 5d    55.6    56%    196.6     10%    170.4    21%    38.1    23%    35    23%     476     18%  2025     30w 0d    77.9    77%    278.7     28%    243.2    10%    52.8    3%     46.8    4%    1282     9%  04/3/2025     34w 0d    87.1    80%    310.4     31%    286.1    17%    58.9    <1%    54.8    9%    1985     11%  2025    37w 1d    92.2    74%    327.1     24%    324    40%      67.3    4%     58.5    7%    2846     30%  Method  =======    Transabdominal ultrasound examination. View: Sufficient  Pregnancy  ==========    Dow pregnancy. Number of fetuses: 1  Dating  =======    LMP on: 2024  GA by LMP 37 w + 1 d  DOUGLAS by LMP: 5/15/2025  GA by prior assessment 37 w + 1 d  DOUGLAS by prior assessment: 5/15/2025  Ultrasound examination on: 2025  GA by U/S based upon: AC, BPD, Femur, HC  GA by U/S 36 w + 3 d  DOUGLAS by U/S: 2025  Assigned: based on the LMP, selected on 2024  Assigned GA 37 w + 1 d  Assigned DOUGLAS: 5/15/2025  General Evaluation  ===================    Cardiac activity present.  bpm. Fetal movements: visualized. Presentation: cephalic  Placenta: anterior, high  Umbilical cord: Cord vessels: 3 vessel cord.  Insertion site: normal insertion  Amniotic fluid: MVP 4.6 cm. DOTTIE 13.8 cm  Fetal Biometry  ===============    Standard  BPD 92.2 mm 37w 3d 74% Hadlock  .4 mm 39w 3d 78% Fahad  .1 mm 37w 1d 24% Hadlock  .0 mm 36w 2d 40% Hadlock  Femur 67.3 mm 34w 4d 4% Hadlock  Humerus 58.5 mm 33w 6d 7% Fahad  HC / AC 1.01  EFW 2,846 g 36w 1d 30% Hadlock  EFW (lb) 6 lb  EFW (oz) 4 oz  EFW by: Hadlock (BPD-HC-AC-FL)  Head / Face / Neck  Cephalic index 0.81  36% Nicolaides  Extremities / Bony Struc  FL / BPD 0.73  FL / HC 0.21  FL / AC 0.21  Other Structures   bpm  Fetal Anatomy  ==============    Cranium: normal  Lateral ventricles: normal  Cavum septi pellucidi: normal  Cerebellum: normal  Cisterna magna: normal  4-chamber view: normal  3-vessel-trachea view: normal  Stomach: normal  Kidneys: normal  Bladder: normal  Maternal Structures  ====================    Ovaries / Tubes / Adnexa  Rt ovary: Not visualized  Lt ovary: Visualized  Recommendations  ================    Follow-up as clinically indicated.  Impression: Impression  ===========    Single viable IUP with biometry consistent with 37w 1d corresponding to an EDC of 05/15/2025  Fetal growth is appropriate.       A/P:     Problem List Items Addressed This Visit       Encounter for supervision of normal first pregnancy in third trimester    Pt is a 24 y.o.  at 37w4d gestation here today for routine prenatal care.   Size equal to dates      Discuss Labor and pre-eclampsia precautions.  Discuss FMA and FKCs  Address concerns: no concerns   Growth US reviewed - results WNL, IUGR resolved  GBS Negative.  Rh positive  RTC 1 wks.           IUGR - *resolved*

## 2025-04-28 NOTE — ASSESSMENT & PLAN NOTE
Pt is a 24 y.o.  at 37w4d gestation here today for routine prenatal care.   Size equal to dates      Discuss Labor and pre-eclampsia precautions.  Discuss FMA and FKCs  Address concerns: no concerns   Growth US reviewed - results WNL, IUGR resolved  GBS Negative.  Rh positive  RTC 1 wks.

## 2025-05-04 NOTE — PROGRESS NOTES
S: Pt is a 24 y.o.  at 38w4d gestation here today for routine prenatal care.     Concerns today include:  Denies concerns, would like a cervical exam today.     Denies: vaginal bleeding, pelvic and abdominal pain, cramping, contractions, leaking of fluid, urinary and vaginal symptoms and headaches, visual changes, epigastric pain    Pt reports fetal movement as Present     O: /60   Wt 207 lb   LMP 2024 (Approximate)    *see prenatal flowsheet*     Labs:     Prenatal labs: Completed and normal  GTT: 89   GBS: Negative.  Genetic testing: NIPT low risk, tetra screen negative   STI testing: negative    Ultrasound: none since last visit       A/P:     Problem List Items Addressed This Visit       Encounter for supervision of normal first pregnancy in third trimester    Pt is a 24 y.o.  at 38w4d gestation here today for routine prenatal care.   Size equal to dates    Discuss Labor and pre-eclampsia precautions.  Discuss FMA and FKCs  Address concerns: no concerns   Education reviewed:  Pain management in labor - considering going without epidural   Labor signs/symptoms, early labor comfort measures  Reasons to come to OB triage  GBS Negative.  Rh positive  Tdap: Administered prior visit.  RTC 1 wk.

## 2025-05-05 ENCOUNTER — ROUTINE PRENATAL (OUTPATIENT)
Dept: OBGYN | Facility: CLINIC | Age: 25
End: 2025-05-05
Payer: MEDICAID

## 2025-05-05 VITALS — DIASTOLIC BLOOD PRESSURE: 60 MMHG | SYSTOLIC BLOOD PRESSURE: 116 MMHG | WEIGHT: 207 LBS

## 2025-05-05 DIAGNOSIS — Z34.03 ENCOUNTER FOR SUPERVISION OF NORMAL FIRST PREGNANCY IN THIRD TRIMESTER: ICD-10-CM

## 2025-05-05 PROCEDURE — 3074F SYST BP LT 130 MM HG: CPT

## 2025-05-05 PROCEDURE — 3078F DIAST BP <80 MM HG: CPT

## 2025-05-05 PROCEDURE — 0502F SUBSEQUENT PRENATAL CARE: CPT

## 2025-05-05 NOTE — PROGRESS NOTES
Pt here today for OB follow up  Pt states no complaints   Reports +FM  Good # [phone]  Pharmacy Confirmed.  Chaperone offered and not indicated.   GBS negative.

## 2025-05-05 NOTE — ASSESSMENT & PLAN NOTE
Pt is a 24 y.o.  at 38w4d gestation here today for routine prenatal care.   Size equal to dates    Discuss Labor and pre-eclampsia precautions.  Discuss FMA and FKCs  Address concerns: no concerns   Education reviewed:  Pain management in labor - considering going without epidural   Labor signs/symptoms, early labor comfort measures  Reasons to come to OB triage  GBS Negative.  Rh positive  Tdap: Administered prior visit.  RTC 1 wk.

## 2025-05-12 ENCOUNTER — ROUTINE PRENATAL (OUTPATIENT)
Dept: OBGYN | Facility: CLINIC | Age: 25
End: 2025-05-12
Payer: MEDICAID

## 2025-05-12 VITALS — DIASTOLIC BLOOD PRESSURE: 60 MMHG | SYSTOLIC BLOOD PRESSURE: 102 MMHG | WEIGHT: 209 LBS

## 2025-05-12 DIAGNOSIS — Z34.03 ENCOUNTER FOR SUPERVISION OF NORMAL FIRST PREGNANCY IN THIRD TRIMESTER: ICD-10-CM

## 2025-05-12 PROCEDURE — 0502F SUBSEQUENT PRENATAL CARE: CPT | Performed by: PHYSICIAN ASSISTANT

## 2025-05-12 PROCEDURE — 3078F DIAST BP <80 MM HG: CPT | Performed by: PHYSICIAN ASSISTANT

## 2025-05-12 PROCEDURE — 3074F SYST BP LT 130 MM HG: CPT | Performed by: PHYSICIAN ASSISTANT

## 2025-05-12 NOTE — PROGRESS NOTES
Pt. Here for OB/FU. Reports Good FM.   Good # [phone]  Pt states she has lost some of her mucus plug   GBS negative

## 2025-05-12 NOTE — PROGRESS NOTES
Pt has no complaints with cramping, UCs, VB, LOF though pt notes some mucous plug lost yesterday only. +FM. GBS neg. IOL d/w pt and will scheduled for 41wk, though pt hoping for natural labor. Declines cervical exam today. Labor precautions stressed. Daily FKC recommended. RTC 1 wk or sooner prn.

## 2025-05-19 ENCOUNTER — ROUTINE PRENATAL (OUTPATIENT)
Dept: OBGYN | Facility: CLINIC | Age: 25
End: 2025-05-19
Payer: MEDICAID

## 2025-05-19 ENCOUNTER — HOSPITAL ENCOUNTER (EMERGENCY)
Facility: MEDICAL CENTER | Age: 25
End: 2025-05-19
Attending: STUDENT IN AN ORGANIZED HEALTH CARE EDUCATION/TRAINING PROGRAM | Admitting: STUDENT IN AN ORGANIZED HEALTH CARE EDUCATION/TRAINING PROGRAM
Payer: MEDICAID

## 2025-05-19 VITALS
HEIGHT: 66 IN | TEMPERATURE: 98.2 F | RESPIRATION RATE: 16 BRPM | WEIGHT: 215 LBS | BODY MASS INDEX: 34.55 KG/M2 | DIASTOLIC BLOOD PRESSURE: 62 MMHG | SYSTOLIC BLOOD PRESSURE: 126 MMHG

## 2025-05-19 VITALS — DIASTOLIC BLOOD PRESSURE: 68 MMHG | WEIGHT: 213 LBS | SYSTOLIC BLOOD PRESSURE: 122 MMHG

## 2025-05-19 DIAGNOSIS — Z34.03 ENCOUNTER FOR SUPERVISION OF NORMAL FIRST PREGNANCY IN THIRD TRIMESTER: Primary | ICD-10-CM

## 2025-05-19 PROCEDURE — 3078F DIAST BP <80 MM HG: CPT | Performed by: OBSTETRICS & GYNECOLOGY

## 2025-05-19 PROCEDURE — 0502F SUBSEQUENT PRENATAL CARE: CPT | Performed by: OBSTETRICS & GYNECOLOGY

## 2025-05-19 PROCEDURE — 3074F SYST BP LT 130 MM HG: CPT | Performed by: OBSTETRICS & GYNECOLOGY

## 2025-05-19 PROCEDURE — 99282 EMERGENCY DEPT VISIT SF MDM: CPT

## 2025-05-19 PROCEDURE — 59025 FETAL NON-STRESS TEST: CPT | Performed by: OBSTETRICS & GYNECOLOGY

## 2025-05-19 PROCEDURE — 59025 FETAL NON-STRESS TEST: CPT

## 2025-05-19 NOTE — PROGRESS NOTES
NST review    The patient is a 24-year-old  young woman presents at 40-4/7 weeks gestation for NST secondary to postdates.  The patient is scheduled for an induction of labor at 41 weeks.  The patient declines an examination.    Baseline fetal heart rate is 130 bpm with moderate variability and accelerations.  No significant decelerations identified.  Occasional uterine contractions were noted.  She has a reactive NST and a category 1 tracing.  She was given precautions regarding fetal movement and labor precautions with follow-up instructions.

## 2025-05-19 NOTE — PROGRESS NOTES
Pt. Here for OB/FU.   Reports Good FM.    Pt. Denies VB or LOF..     Pt desires a cervical check:  No.    Pt states: Is currently having UC.  Every 15 min for 2 hours.       Phone/Pharm verified. [phone]

## 2025-05-20 NOTE — PROGRESS NOTES
Pt presents to [facility] c/o abd pain since 1700. Pt denies VB or LOF. FM+ poc discussed. Pt placed on monitors x2. SVE 2-3/70/-3    2004- olya at bedside    2202- yudi updated on pt.    2204- olya at bedside    2210- d/c orders received. Pt v/u of d/c instructions.

## 2025-05-20 NOTE — ED PROVIDER NOTES
"S: Pt is a 24 y.o.  at 40w4d with Estimated Date of Delivery: 5/15/25 who presents to triage c/o irregular contractions. Denies VB, LOF.  Reports normal FM.      O: /62   Temp 36.8 °C (98.2 °F) (Temporal)   Resp 16   Ht 1.676 m (5' 6\")   Wt 97.5 kg (215 lb)   LMP 2024 (Approximate)   BMI 34.70 kg/m²     NST:       FHR baseline: 135       Variability: moderate       Acclerations: present       Decelerations: none  [place]: Ucs q5-15 minutes  SVE: 2-3/70/-3 per RN    Physical Exam  Pulmonary:      Effort: Pulmonary effort is normal.   Neurological:      General: No focal deficit present.      Mental Status: She is alert.           A/P  Patient Active Problem List    Diagnosis Date Noted    IUGR - *resolved* 2025    Encounter for supervision of normal first pregnancy in third trimester 2024       #24 y.o.  @ 40w4d  #Contractions  -No cervical change after 2 hours per RN; contractions are irregular    #Fetal well-being  -NST reactive    #Stable for discharge home  -Discussed labor precautions, including 5-1-1 rule. Instructed to report to hospital with leaking of fluid, concerns for decreased FM, excessive vaginal bleeding.   -Pt has IOL scheduled Thursday if no labor sooner      Cate Pena CNM, GAL Weeks MD is the Attending    "

## 2025-05-22 ENCOUNTER — APPOINTMENT (OUTPATIENT)
Dept: OBGYN | Facility: MEDICAL CENTER | Age: 25
End: 2025-05-22
Attending: OBSTETRICS & GYNECOLOGY
Payer: MEDICAID

## 2025-05-22 ENCOUNTER — HOSPITAL ENCOUNTER (INPATIENT)
Facility: MEDICAL CENTER | Age: 25
LOS: 4 days | End: 2025-05-26
Attending: OBSTETRICS & GYNECOLOGY | Admitting: OBSTETRICS & GYNECOLOGY
Payer: MEDICAID

## 2025-05-22 ENCOUNTER — ANESTHESIA (OUTPATIENT)
Dept: OBGYN | Facility: MEDICAL CENTER | Age: 25
End: 2025-05-22
Payer: MEDICAID

## 2025-05-22 ENCOUNTER — ANESTHESIA EVENT (OUTPATIENT)
Dept: OBGYN | Facility: MEDICAL CENTER | Age: 25
End: 2025-05-22
Payer: MEDICAID

## 2025-05-22 DIAGNOSIS — D62 ACUTE BLOOD LOSS ANEMIA (ABLA): ICD-10-CM

## 2025-05-22 LAB
BASOPHILS # BLD AUTO: 0.8 % (ref 0–1.8)
BASOPHILS # BLD: 0.1 K/UL (ref 0–0.12)
EOSINOPHIL # BLD AUTO: 0.16 K/UL (ref 0–0.51)
EOSINOPHIL NFR BLD: 1.2 % (ref 0–6.9)
ERYTHROCYTE [DISTWIDTH] IN BLOOD BY AUTOMATED COUNT: 42.8 FL (ref 35.9–50)
HCT VFR BLD AUTO: 35.9 % (ref 37–47)
HGB BLD-MCNC: 11.3 G/DL (ref 12–16)
HOLDING TUBE BB 8507: NORMAL
IMM GRANULOCYTES # BLD AUTO: 0.54 K/UL (ref 0–0.11)
IMM GRANULOCYTES NFR BLD AUTO: 4.2 % (ref 0–0.9)
LYMPHOCYTES # BLD AUTO: 2.16 K/UL (ref 1–4.8)
LYMPHOCYTES NFR BLD: 16.8 % (ref 22–41)
MCH RBC QN AUTO: 26 PG (ref 27–33)
MCHC RBC AUTO-ENTMCNC: 31.5 G/DL (ref 32.2–35.5)
MCV RBC AUTO: 82.5 FL (ref 81.4–97.8)
MONOCYTES # BLD AUTO: 0.83 K/UL (ref 0–0.85)
MONOCYTES NFR BLD AUTO: 6.4 % (ref 0–13.4)
NEUTROPHILS # BLD AUTO: 9.09 K/UL (ref 1.82–7.42)
NEUTROPHILS NFR BLD: 70.6 % (ref 44–72)
NRBC # BLD AUTO: 0 K/UL
NRBC BLD-RTO: 0 /100 WBC (ref 0–0.2)
PLATELET # BLD AUTO: 324 K/UL (ref 164–446)
PMV BLD AUTO: 11 FL (ref 9–12.9)
RBC # BLD AUTO: 4.35 M/UL (ref 4.2–5.4)
T PALLIDUM AB SER QL IA: NORMAL
WBC # BLD AUTO: 12.9 K/UL (ref 4.8–10.8)

## 2025-05-22 PROCEDURE — 700102 HCHG RX REV CODE 250 W/ 637 OVERRIDE(OP): Performed by: OBSTETRICS & GYNECOLOGY

## 2025-05-22 PROCEDURE — 700111 HCHG RX REV CODE 636 W/ 250 OVERRIDE (IP): Performed by: STUDENT IN AN ORGANIZED HEALTH CARE EDUCATION/TRAINING PROGRAM

## 2025-05-22 PROCEDURE — 700105 HCHG RX REV CODE 258: Performed by: OBSTETRICS & GYNECOLOGY

## 2025-05-22 PROCEDURE — 700101 HCHG RX REV CODE 250: Performed by: STUDENT IN AN ORGANIZED HEALTH CARE EDUCATION/TRAINING PROGRAM

## 2025-05-22 PROCEDURE — 85025 COMPLETE CBC W/AUTO DIFF WBC: CPT

## 2025-05-22 PROCEDURE — 36415 COLL VENOUS BLD VENIPUNCTURE: CPT

## 2025-05-22 PROCEDURE — 700105 HCHG RX REV CODE 258: Performed by: STUDENT IN AN ORGANIZED HEALTH CARE EDUCATION/TRAINING PROGRAM

## 2025-05-22 PROCEDURE — A9270 NON-COVERED ITEM OR SERVICE: HCPCS | Performed by: OBSTETRICS & GYNECOLOGY

## 2025-05-22 PROCEDURE — 700111 HCHG RX REV CODE 636 W/ 250 OVERRIDE (IP): Mod: JZ | Performed by: OBSTETRICS & GYNECOLOGY

## 2025-05-22 PROCEDURE — 770002 HCHG ROOM/CARE - OB PRIVATE (112)

## 2025-05-22 PROCEDURE — 86780 TREPONEMA PALLIDUM: CPT

## 2025-05-22 RX ORDER — SODIUM CHLORIDE, SODIUM LACTATE, POTASSIUM CHLORIDE, AND CALCIUM CHLORIDE .6; .31; .03; .02 G/100ML; G/100ML; G/100ML; G/100ML
1000 INJECTION, SOLUTION INTRAVENOUS
Status: COMPLETED | OUTPATIENT
Start: 2025-05-22 | End: 2025-05-22

## 2025-05-22 RX ORDER — ONDANSETRON 4 MG/1
4 TABLET, ORALLY DISINTEGRATING ORAL EVERY 6 HOURS PRN
Status: DISCONTINUED | OUTPATIENT
Start: 2025-05-22 | End: 2025-05-23 | Stop reason: HOSPADM

## 2025-05-22 RX ORDER — ROPIVACAINE HYDROCHLORIDE 2 MG/ML
INJECTION, SOLUTION EPIDURAL; INFILTRATION
Status: COMPLETED | OUTPATIENT
Start: 2025-05-22 | End: 2025-05-22

## 2025-05-22 RX ORDER — TERBUTALINE SULFATE 1 MG/ML
0.25 INJECTION SUBCUTANEOUS
Status: DISCONTINUED | OUTPATIENT
Start: 2025-05-22 | End: 2025-05-23 | Stop reason: HOSPADM

## 2025-05-22 RX ORDER — ACETAMINOPHEN 500 MG
1000 TABLET ORAL
Status: DISCONTINUED | OUTPATIENT
Start: 2025-05-22 | End: 2025-05-23 | Stop reason: HOSPADM

## 2025-05-22 RX ORDER — CALCIUM CARBONATE 500 MG/1
1000 TABLET, CHEWABLE ORAL 3 TIMES DAILY
Status: DISPENSED | OUTPATIENT
Start: 2025-05-22 | End: 2025-05-24

## 2025-05-22 RX ORDER — OXYTOCIN 10 [USP'U]/ML
10 INJECTION, SOLUTION INTRAMUSCULAR; INTRAVENOUS
Status: DISCONTINUED | OUTPATIENT
Start: 2025-05-22 | End: 2025-05-23 | Stop reason: HOSPADM

## 2025-05-22 RX ORDER — ONDANSETRON 2 MG/ML
4 INJECTION INTRAMUSCULAR; INTRAVENOUS EVERY 6 HOURS PRN
Status: DISCONTINUED | OUTPATIENT
Start: 2025-05-22 | End: 2025-05-23 | Stop reason: HOSPADM

## 2025-05-22 RX ORDER — ROPIVACAINE HYDROCHLORIDE 2 MG/ML
INJECTION, SOLUTION EPIDURAL; INFILTRATION; PERINEURAL CONTINUOUS
Status: DISCONTINUED | OUTPATIENT
Start: 2025-05-22 | End: 2025-05-26

## 2025-05-22 RX ORDER — LIDOCAINE HYDROCHLORIDE 10 MG/ML
20 INJECTION, SOLUTION INFILTRATION; PERINEURAL
Status: DISCONTINUED | OUTPATIENT
Start: 2025-05-22 | End: 2025-05-23 | Stop reason: HOSPADM

## 2025-05-22 RX ORDER — IBUPROFEN 800 MG/1
800 TABLET, FILM COATED ORAL
Status: DISCONTINUED | OUTPATIENT
Start: 2025-05-22 | End: 2025-05-23 | Stop reason: HOSPADM

## 2025-05-22 RX ORDER — EPHEDRINE SULFATE 50 MG/ML
5 INJECTION, SOLUTION INTRAVENOUS
Status: DISCONTINUED | OUTPATIENT
Start: 2025-05-22 | End: 2025-05-23 | Stop reason: HOSPADM

## 2025-05-22 RX ORDER — SODIUM CHLORIDE, SODIUM LACTATE, POTASSIUM CHLORIDE, AND CALCIUM CHLORIDE .6; .31; .03; .02 G/100ML; G/100ML; G/100ML; G/100ML
250 INJECTION, SOLUTION INTRAVENOUS PRN
Status: DISCONTINUED | OUTPATIENT
Start: 2025-05-22 | End: 2025-05-23 | Stop reason: HOSPADM

## 2025-05-22 RX ORDER — LIDOCAINE HYDROCHLORIDE AND EPINEPHRINE 15; 5 MG/ML; UG/ML
INJECTION, SOLUTION EPIDURAL
Status: COMPLETED | OUTPATIENT
Start: 2025-05-22 | End: 2025-05-22

## 2025-05-22 RX ORDER — SODIUM CHLORIDE, SODIUM LACTATE, POTASSIUM CHLORIDE, CALCIUM CHLORIDE 600; 310; 30; 20 MG/100ML; MG/100ML; MG/100ML; MG/100ML
INJECTION, SOLUTION INTRAVENOUS CONTINUOUS
Status: DISCONTINUED | OUTPATIENT
Start: 2025-05-22 | End: 2025-05-23

## 2025-05-22 RX ADMIN — SODIUM CHLORIDE, POTASSIUM CHLORIDE, SODIUM LACTATE AND CALCIUM CHLORIDE 1000 ML: 600; 310; 30; 20 INJECTION, SOLUTION INTRAVENOUS at 16:40

## 2025-05-22 RX ADMIN — SODIUM CHLORIDE, POTASSIUM CHLORIDE, SODIUM LACTATE AND CALCIUM CHLORIDE: 600; 310; 30; 20 INJECTION, SOLUTION INTRAVENOUS at 22:45

## 2025-05-22 RX ADMIN — ANTACID TABLETS 1000 MG: 500 TABLET, CHEWABLE ORAL at 17:51

## 2025-05-22 RX ADMIN — LIDOCAINE HYDROCHLORIDE,EPINEPHRINE BITARTRATE 3 ML: 15; .005 INJECTION, SOLUTION EPIDURAL; INFILTRATION; INTRACAUDAL; PERINEURAL at 16:59

## 2025-05-22 RX ADMIN — OXYTOCIN 2 MILLI-UNITS/MIN: 10 INJECTION, SOLUTION INTRAMUSCULAR; INTRAVENOUS at 12:21

## 2025-05-22 RX ADMIN — SODIUM CHLORIDE, POTASSIUM CHLORIDE, SODIUM LACTATE AND CALCIUM CHLORIDE: 600; 310; 30; 20 INJECTION, SOLUTION INTRAVENOUS at 12:18

## 2025-05-22 RX ADMIN — ROPIVACAINE HYDROCHLORIDE: 2 INJECTION, SOLUTION EPIDURAL; INFILTRATION; PERINEURAL at 17:34

## 2025-05-22 RX ADMIN — ROPIVACAINE HYDROCHLORIDE 10 ML: 2 INJECTION EPIDURAL; INFILTRATION; PERINEURAL at 17:03

## 2025-05-22 SDOH — ECONOMIC STABILITY: TRANSPORTATION INSECURITY
IN THE PAST 12 MONTHS, HAS LACK OF RELIABLE TRANSPORTATION KEPT YOU FROM MEDICAL APPOINTMENTS, MEETINGS, WORK OR FROM GETTING THINGS NEEDED FOR DAILY LIVING?: NO

## 2025-05-22 SDOH — ECONOMIC STABILITY: TRANSPORTATION INSECURITY
IN THE PAST 12 MONTHS, HAS THE LACK OF TRANSPORTATION KEPT YOU FROM MEDICAL APPOINTMENTS OR FROM GETTING MEDICATIONS?: NO

## 2025-05-22 ASSESSMENT — SOCIAL DETERMINANTS OF HEALTH (SDOH)
WITHIN THE LAST YEAR, HAVE YOU BEEN KICKED, HIT, SLAPPED, OR OTHERWISE PHYSICALLY HURT BY YOUR PARTNER OR EX-PARTNER?: NO
IN THE PAST 12 MONTHS, HAS THE ELECTRIC, GAS, OIL, OR WATER COMPANY THREATENED TO SHUT OFF SERVICE IN YOUR HOME?: NO
WITHIN THE PAST 12 MONTHS, THE FOOD YOU BOUGHT JUST DIDN'T LAST AND YOU DIDN'T HAVE MONEY TO GET MORE: NEVER TRUE
WITHIN THE LAST YEAR, HAVE YOU BEEN HUMILIATED OR EMOTIONALLY ABUSED IN OTHER WAYS BY YOUR PARTNER OR EX-PARTNER?: NO
WITHIN THE LAST YEAR, HAVE TO BEEN RAPED OR FORCED TO HAVE ANY KIND OF SEXUAL ACTIVITY BY YOUR PARTNER OR EX-PARTNER?: NO
WITHIN THE PAST 12 MONTHS, YOU WORRIED THAT YOUR FOOD WOULD RUN OUT BEFORE YOU GOT THE MONEY TO BUY MORE: NEVER TRUE
WITHIN THE LAST YEAR, HAVE YOU BEEN AFRAID OF YOUR PARTNER OR EX-PARTNER?: NO

## 2025-05-22 ASSESSMENT — LIFESTYLE VARIABLES
ALCOHOL_USE: NO
DOES PATIENT WANT TO STOP DRINKING: NO

## 2025-05-22 ASSESSMENT — PATIENT HEALTH QUESTIONNAIRE - PHQ9
1. LITTLE INTEREST OR PLEASURE IN DOING THINGS: NOT AT ALL
SUM OF ALL RESPONSES TO PHQ9 QUESTIONS 1 AND 2: 0
2. FEELING DOWN, DEPRESSED, IRRITABLE, OR HOPELESS: NOT AT ALL

## 2025-05-22 ASSESSMENT — PAIN DESCRIPTION - PAIN TYPE
TYPE: ACUTE PAIN

## 2025-05-22 NOTE — H&P
[facility] Women's Health  History and Physical      Crystal Grewal is a 24 y.o. female  at 41w0d by LMP who presents for IOL for postdates.    CC: No chief complaint on file.      Subjective:   Patient feels well and has no acute complaints    Uterine contractions: yes  Leakage of fluid: no  vaginal bleeding: no  fetal movement: yes    ROS:  GEN: denies fever, chills  HEENT: denies headache, denies blurry vision  CV: denies chest pain or palpitations, no BLE edema  RESP: denies chest pain or shortness of breath  ABD: denies RUQ pain  : denies dysuria    Prenatal care with [facility] with following problems:  Patient Active Problem List    Diagnosis Date Noted    IUGR - *resolved* 2025    Encounter for supervision of normal first pregnancy in third trimester 2024       Past Medical History[1]  Past Surgical History[2]  Family History   Problem Relation Age of Onset    Diabetes Father     Breast Cancer Maternal Aunt     Diabetes Paternal Grandmother     Diabetes Paternal Grandfather      OB History    Para Term  AB Living   1        SAB IAB Ectopic Molar Multiple Live Births              # Outcome Date GA Lbr Pardeep/2nd Weight Sex Type Anes PTL Lv   1 Current              Social History     Socioeconomic History    Marital status: Single     Spouse name: Not on file    Number of children: Not on file    Years of education: Not on file    Highest education level: Not on file   Occupational History    Not on file   Tobacco Use    Smoking status: Never    Smokeless tobacco: Never   Vaping Use    Vaping status: Never Used   Substance and Sexual Activity    Alcohol use: Never    Drug use: Not Currently    Sexual activity: Yes     Partners: Male     Birth control/protection: None   Other Topics Concern    Not on file   Social History Narrative    Not on file     Social Drivers of Health     Financial Resource Strain: Not on file   Food Insecurity: Not on file   Transportation Needs: Not on file    Physical Activity: Not on file   Stress: Not on file   Social Connections: Not on file   Intimate Partner Violence: Not on file   Housing Stability: Not on file     Allergies[3]   Current Medications[4]    Objective:      Vitals:    05/22/25 1100   BP: 118/71   Pulse: 85   Resp: 16   Temp: 36.5 °C (97.7 °F)        GEN: NAD, AAOx3, calm, cooperative, laying comfortably in bed  HEENT: NCAT, EOMI  CV: +S1S2, RRR, no BLE edema, radial pulses 2+  RESP: CTAB, no cough, breathing comfortably on RA  ABS: soft, NTTP, gravid  : no lesions  MSK: moving all extremities    SVE: 2-3/70/-2    FHT: Cat 1, baseline 130, variability mod, +accels, -decels, +UC q10min    BSUS: cephalic  EFW: 30%ile at 37w US, estimated 3000g by leopold  Fetal presentation: cephalic  Placenta: anterior, high    Lab Review  Lab:   Blood type: O   Recent Labs     11/13/24  1440 02/14/25  1007   ABOGROUP O  --    ABSCRN NEG  --    HEMOGLOBIN 12.0 11.7*   PLATELETCT 275 298   RUBELLAIGG 85.90  --    HEPBSAG Non-Reactive  --    HEPCAB Non-Reactive  --       Recent Results (from the past 35 weeks)   Chlamydia/GC, PCR (Genital/Anal swab)    Collection Time: 11/13/24  2:21 PM    Specimen: Genital   Result Value Ref Range    C. trachomatis by PCR Negative Negative    N. gonorrhoeae by PCR Negative Negative    Source Genital    PREG CNTR PRENATAL PN    Collection Time: 11/13/24  2:40 PM   Result Value Ref Range    WBC 10.1 4.8 - 10.8 K/uL    RBC 4.34 4.20 - 5.40 M/uL    Hemoglobin 12.0 12.0 - 16.0 g/dL    Hematocrit 38.1 37.0 - 47.0 %    MCV 87.8 81.4 - 97.8 fL    MCH 27.6 27.0 - 33.0 pg    MCHC 31.5 (L) 32.2 - 35.5 g/dL    RDW 43.1 35.9 - 50.0 fL    Platelet Count 275 164 - 446 K/uL    MPV 11.5 9.0 - 12.9 fL    Hepatitis C Antibody Non-Reactive Non-Reactive    Hepatitis B Surface Antigen Non-Reactive Non-Reactive    Rubella IgG Antibody 85.90 IU/mL    Syphilis, Treponemal Qual Non-Reactive Non-Reactive   URINE DRUG SCREEN W/CONF (AR)    Collection Time:  11/13/24  2:40 PM   Result Value Ref Range    Urine Amphetamine-Methamphetam Negative Cutoff 300 ng/mL    Barbiturates Negative Cutoff 200 ng/mL    Benzodiazepines Negative Cutoff 200 ng/mL    Propoxyphene Negative Cutoff 300 ng/mL    Cocaine Metabolite Negative Cutoff 150 ng/mL    Methadone Negative Cutoff 150 ng/mL    Codeine-Morphine Negative Cutoff 300 ng/mL    Phencyclidine -Pcp Negative Cutoff 25 ng/mL    Cannabinoid Metab Negative Cutoff 50 ng/mL    Creatinine Urine 125.7 20.0 - 400.0 mg/dL    Drug Comment Urine Drugs See Note    URINE CULTURE(NEW)    Collection Time: 11/13/24  2:40 PM    Specimen: Urine   Result Value Ref Range    Significant Indicator NEG     Source UR     Site Clean Catch     Culture Result Usual urogenital manolo ,000 cfu/mL    HIV AG/AB COMBO ASSAY SCREENING    Collection Time: 11/13/24  2:40 PM   Result Value Ref Range    HIV Ag/Ab Combo Assay Non-Reactive Non Reactive   OP Prenatal Panel-Blood Bank    Collection Time: 11/13/24  2:40 PM   Result Value Ref Range    ABO Grouping Only O     Rh Grouping Only POS     Antibody Screen Scrn NEG    VAGINAL PATHOGENS DNA PANEL    Collection Time: 12/11/24  3:23 PM   Result Value Ref Range    Candida species DNA Probe Negative Negative    Trichomonas vaginalis DNA Probe Negative Negative    Gardnerella vaginalis DNA Probe POSITIVE (A) Negative   AFP TETRA    Collection Time: 12/11/24  3:40 PM   Result Value Ref Range    AFP Value -Eia 24 ng/mL    AFP MOM Value 0.61     Ue3 Value 1.63 ng/mL    Ue3 Mom 0.78     Patient's hCG, 2nd Trimester 99645 IU/L    hCG MoM, 2nd Trimester 1.04     Stephani Value -Eia 163 pg/mL    Stephani Mom Value 1.04     Interpretation Screen Neg     [age]GLAS [age]    Maternal Weight 178.0 lbs.     Gest. Age on Collection Date 17 wks, 6 days     Gestational Age Based On Other     Multiple Pregnancy Dow     Race Unknown     Insulin Dependent Diabetes No     Smoking No     Family Hx NTD No     Family Hx of  Aneuploidy No     Specimen See Note     EER Quad, Maternal Serum See Note    PANORAMA PRENATAL TEST    Collection Time: 12/14/24  8:49 AM   Result Value Ref Range    REPORT SUMMARY LOW RISK     REPORT NOTE See Notes     GENDER OF FETUS Female     FETAL FRACTION 6.1%     TRISOMY 21 RESULT TEXT Low Risk     TRISOMY 21 AGE-BASED RISK TEXT 1/1,295 (0.08%)     TRISOMY 21 RISK SCORE TEXT <1/10,000 (<0.01%)     TRISOMY 18 RESULT TEXT Low Risk     TRISOMY 18 AGE-BASED RISK TEXT 1/4,897 (0.02%)     TRISOMY 18 RISK SCORE TEXT <1/10,000 (<0.01%)     TRISOMY 13 RESULT TEXT Low Risk     TRISOMY 13 AGE-BASED RISK TEXT <1/10,000 (<0.01%)     TRISOMY 13 RISK SCORE TEXT <1/10,000 (<0.01%)     MONOSOMY X RESULT TEXT Low Risk     MONOSOMY X AGE-BASED RISK TEXT 1/568 (0.18%)     MONOSOMY X RISK SCORE TEXT <1/10,000 (<0.01%)     TRIPLOIDY RESULT TEXT Low Risk     22Q11.2 DELETION SYNDROME RESULT TEXT Low Risk     22Q11.2 DELETION SYNDROME POPULATION-BASED RISK TEXT 1/2,000     22Q11.2 DELETION SYNDROME RISK SCORE TEXT 1/3,100     1P36 DELETION SYNDROME RESULT TEXT Low Risk     1P36 DELETION SYNDROME POPULATION-BASED RISK TEXT 1/5,000     1P36 DELETION SYNDROME RISK SCORE TEXT 1/6,300     ANGELMAN SYNDROME RESULT TEXT Risk Unchanged     ANGELMAN SYNDROME POPULATION-BASED RISK TEXT 1/12,000     ANGELMAN SYNDROME RISK SCORE TEXT 1/12,000     CRI-DU-CHAT SYNDROME RESULT TEXT Low Risk     CRI-DU-CHAT SYNDROME POPULATION-BASED RISK TEXT 1/20,000     CRI-DU-CHAT SYNDROME RISK SCORE TEXT 1/27,000     PRADER-WILLI SYNDROME RESULT TEXT Low Risk     PRADER-WILLI SYNDROME POPULATION-BASED RISK TEXT 1/10,000     PRADER-WILLI SYNDROME RISK SCORE TEXT 1/13,800     FOOTNOTES See Notes    T.PALLIDUM AB ADDIS (SCREENING)    Collection Time: 02/14/25 10:07 AM   Result Value Ref Range    Syphilis, Treponemal Qual Non-Reactive Non-Reactive   CBC WITHOUT DIFFERENTIAL    Collection Time: 02/14/25 10:07 AM   Result Value Ref Range    WBC 11.9 (H) 4.8 - 10.8 K/uL     RBC 4.09 (L) 4.20 - 5.40 M/uL    Hemoglobin 11.7 (L) 12.0 - 16.0 g/dL    Hematocrit 37.0 37.0 - 47.0 %    MCV 90.5 81.4 - 97.8 fL    MCH 28.6 27.0 - 33.0 pg    MCHC 31.6 (L) 32.2 - 35.5 g/dL    RDW 44.8 35.9 - 50.0 fL    Platelet Count 298 164 - 446 K/uL    MPV 11.3 9.0 - 12.9 fL   GLUCOSE 1HR GESTATIONAL    Collection Time: 25 10:07 AM   Result Value Ref Range    Glucose, Post Dose 89 70 - 139 mg/dL   GRP B STREP, BY PCR (NEWMAN BROTH)    Collection Time: 25  8:34 AM    Specimen: Genital   Result Value Ref Range    Strep Gp B DNA PCR Negative Negative   HOLD BLOOD BANK SPECIMEN (NOT TESTED)    Collection Time: 25 11:00 AM   Result Value Ref Range    Holding Tube - Bb DONE            Assessment and Plan:     Crystal Grewal is a 24 y.o. female  at 41w0d by LMP who presents for IOL for postdates.    #SIUP at 41w0d by LMP  - prenatal care with [facility]  - Labor status: signs of early labor  - BSUS: cephalic    #fetal status, Cat 1  - reassuring  - continuous fetal monitoring    #labor induction/augmentation  - pitocin titrated to adequate contractions  - clear liquid diet  - pt can epidural when she desires  - mIVF as indicated     #GBS neg    #rubella: immune    #HIV neg, Trep neg, HBsAg neg, Hep C neg, GCCT neg    #blood type Opos    #contraception: desires paragaurd at postpartum visit      Disposition: Admit to Labor    Sarkis Villeda M.D., PGY1         [1] History reviewed. No pertinent past medical history.  [2] History reviewed. No pertinent surgical history.  [3] No Known Allergies  [4] No current facility-administered medications for this encounter.

## 2025-05-22 NOTE — PROGRESS NOTES
"S: Patient feeling contractions.    O:/71   Pulse 85   Temp 36.5 °C (97.7 °F) (Temporal)   Resp 16   Ht 1.676 m (5' 6\")   Wt 97.5 kg (215 lb)      [place] - q 2-3 min  EFM - 130s, moderate variability, + accels  Cx - 3 cm/80%/-2  AROM with clear fluid  Pit at 6 mu/min    EFW 3800 grams by Leopold's    A/P 25 yo  @ 41+0/7 weeks admitted for late term IOL.  Fetal status reassuring.  Continue pitocin.  Anticipate .   "

## 2025-05-22 NOTE — PROGRESS NOTES
1001 Patient arrived to labor and delivery for scheduled IOL for PD. Placed in room S222.    1048 EFM in place x2. Admission profile completed. IV started and labs sent. SVE complete, see flowsheets. Patient states +FM. Denies contractions, vaginal bleeding or ROM.    1150 Dr. Cooper given report on patient, admission orders received.     1654 Dr. Peoples at bedside for epidural placement. Patient tolerated procedure well. Test dose negative.    1900 Report given to LION Arellano. POC discussed, questions answered.

## 2025-05-22 NOTE — CARE PLAN
Problem: Risk for Infection and Impaired Wound Healing  Goal: Patient will remain free from infection  Outcome: Progressing  Note: VSS. Patient free from signs and symptoms of infection. GBS negative. Will limit SVE at this time due to unmedicated status.     Problem: Pain  Goal: Patient's pain will be alleviated or reduced to the patient’s comfort goal  Outcome: Progressing  Note: Report no pain at this time with latent stage uterine contractions. Desires unmedicated delivery, but open to pain management if needed when labor becomes active. Education given on pain management options and epidural procedure.   The patient is Stable - Low risk of patient condition declining or worsening    Shift Goals  Clinical Goals: assist in physiological birth  Patient Goals: unmedicated delivery  Family Goals: education

## 2025-05-23 PROCEDURE — 160041 HCHG SURGERY MINUTES - EA ADDL 1 MIN LEVEL 4: Performed by: OBSTETRICS & GYNECOLOGY

## 2025-05-23 PROCEDURE — 700105 HCHG RX REV CODE 258: Performed by: OBSTETRICS & GYNECOLOGY

## 2025-05-23 PROCEDURE — 700111 HCHG RX REV CODE 636 W/ 250 OVERRIDE (IP): Mod: JZ | Performed by: ANESTHESIOLOGY

## 2025-05-23 PROCEDURE — 160009 HCHG ANES TIME/MIN: Performed by: OBSTETRICS & GYNECOLOGY

## 2025-05-23 PROCEDURE — 700102 HCHG RX REV CODE 250 W/ 637 OVERRIDE(OP): Performed by: ANESTHESIOLOGY

## 2025-05-23 PROCEDURE — 700105 HCHG RX REV CODE 258: Performed by: ANESTHESIOLOGY

## 2025-05-23 PROCEDURE — 700111 HCHG RX REV CODE 636 W/ 250 OVERRIDE (IP): Performed by: STUDENT IN AN ORGANIZED HEALTH CARE EDUCATION/TRAINING PROGRAM

## 2025-05-23 PROCEDURE — 160015 HCHG STAT PREOP MINUTES: Performed by: OBSTETRICS & GYNECOLOGY

## 2025-05-23 PROCEDURE — 700111 HCHG RX REV CODE 636 W/ 250 OVERRIDE (IP): Mod: JZ | Performed by: STUDENT IN AN ORGANIZED HEALTH CARE EDUCATION/TRAINING PROGRAM

## 2025-05-23 PROCEDURE — 3E033VJ INTRODUCTION OF OTHER HORMONE INTO PERIPHERAL VEIN, PERCUTANEOUS APPROACH: ICD-10-PCS | Performed by: OBSTETRICS & GYNECOLOGY

## 2025-05-23 PROCEDURE — 700102 HCHG RX REV CODE 250 W/ 637 OVERRIDE(OP): Performed by: ADVANCED PRACTICE MIDWIFE

## 2025-05-23 PROCEDURE — 700111 HCHG RX REV CODE 636 W/ 250 OVERRIDE (IP): Mod: JZ | Performed by: OBSTETRICS & GYNECOLOGY

## 2025-05-23 PROCEDURE — 770002 HCHG ROOM/CARE - OB PRIVATE (112)

## 2025-05-23 PROCEDURE — 700101 HCHG RX REV CODE 250: Performed by: OBSTETRICS & GYNECOLOGY

## 2025-05-23 PROCEDURE — 160035 HCHG PACU - 1ST 60 MINS PHASE I: Performed by: OBSTETRICS & GYNECOLOGY

## 2025-05-23 PROCEDURE — A9270 NON-COVERED ITEM OR SERVICE: HCPCS | Performed by: ANESTHESIOLOGY

## 2025-05-23 PROCEDURE — 88307 TISSUE EXAM BY PATHOLOGIST: CPT | Mod: 26 | Performed by: PATHOLOGY

## 2025-05-23 PROCEDURE — 700105 HCHG RX REV CODE 258: Performed by: STUDENT IN AN ORGANIZED HEALTH CARE EDUCATION/TRAINING PROGRAM

## 2025-05-23 PROCEDURE — 160002 HCHG RECOVERY MINUTES (STAT): Performed by: OBSTETRICS & GYNECOLOGY

## 2025-05-23 PROCEDURE — C1755 CATHETER, INTRASPINAL: HCPCS | Performed by: OBSTETRICS & GYNECOLOGY

## 2025-05-23 PROCEDURE — 160048 HCHG OR STATISTICAL LEVEL 1-5: Performed by: OBSTETRICS & GYNECOLOGY

## 2025-05-23 PROCEDURE — 700111 HCHG RX REV CODE 636 W/ 250 OVERRIDE (IP): Performed by: ADVANCED PRACTICE MIDWIFE

## 2025-05-23 PROCEDURE — 700101 HCHG RX REV CODE 250: Performed by: ANESTHESIOLOGY

## 2025-05-23 PROCEDURE — A9270 NON-COVERED ITEM OR SERVICE: HCPCS | Performed by: ADVANCED PRACTICE MIDWIFE

## 2025-05-23 PROCEDURE — 700105 HCHG RX REV CODE 258: Performed by: ADVANCED PRACTICE MIDWIFE

## 2025-05-23 PROCEDURE — 59510 CESAREAN DELIVERY: CPT | Performed by: OBSTETRICS & GYNECOLOGY

## 2025-05-23 PROCEDURE — 88307 TISSUE EXAM BY PATHOLOGIST: CPT | Performed by: PATHOLOGY

## 2025-05-23 PROCEDURE — 700111 HCHG RX REV CODE 636 W/ 250 OVERRIDE (IP): Performed by: ANESTHESIOLOGY

## 2025-05-23 PROCEDURE — 303615 HCHG EPIDURAL/SPINAL ANESTHESIA FOR LABOR

## 2025-05-23 PROCEDURE — 59514 CESAREAN DELIVERY ONLY: CPT | Mod: 80 | Performed by: STUDENT IN AN ORGANIZED HEALTH CARE EDUCATION/TRAINING PROGRAM

## 2025-05-23 PROCEDURE — 160029 HCHG SURGERY MINUTES - 1ST 30 MINS LEVEL 4: Performed by: OBSTETRICS & GYNECOLOGY

## 2025-05-23 RX ORDER — OXYCODONE HCL 5 MG/5 ML
5 SOLUTION, ORAL ORAL
Status: DISCONTINUED | OUTPATIENT
Start: 2025-05-23 | End: 2025-05-23 | Stop reason: HOSPADM

## 2025-05-23 RX ORDER — DIPHENHYDRAMINE HYDROCHLORIDE 50 MG/ML
25 INJECTION, SOLUTION INTRAMUSCULAR; INTRAVENOUS EVERY 6 HOURS PRN
Status: ACTIVE | OUTPATIENT
Start: 2025-05-23 | End: 2025-05-24

## 2025-05-23 RX ORDER — METOCLOPRAMIDE HYDROCHLORIDE 5 MG/ML
10 INJECTION INTRAMUSCULAR; INTRAVENOUS ONCE
Status: COMPLETED | OUTPATIENT
Start: 2025-05-23 | End: 2025-05-23

## 2025-05-23 RX ORDER — DIPHENHYDRAMINE HYDROCHLORIDE 50 MG/ML
25 INJECTION, SOLUTION INTRAMUSCULAR; INTRAVENOUS EVERY 6 HOURS PRN
Status: DISCONTINUED | OUTPATIENT
Start: 2025-05-24 | End: 2025-05-26 | Stop reason: HOSPADM

## 2025-05-23 RX ORDER — OXYCODONE HYDROCHLORIDE 10 MG/1
10 TABLET ORAL EVERY 4 HOURS PRN
Status: ACTIVE | OUTPATIENT
Start: 2025-05-23 | End: 2025-05-24

## 2025-05-23 RX ORDER — OXYCODONE HCL 5 MG/5 ML
10 SOLUTION, ORAL ORAL
Status: DISCONTINUED | OUTPATIENT
Start: 2025-05-23 | End: 2025-05-23 | Stop reason: HOSPADM

## 2025-05-23 RX ORDER — EPHEDRINE SULFATE 50 MG/ML
10 INJECTION, SOLUTION INTRAVENOUS
Status: ACTIVE | OUTPATIENT
Start: 2025-05-23 | End: 2025-05-24

## 2025-05-23 RX ORDER — ONDANSETRON 2 MG/ML
4 INJECTION INTRAMUSCULAR; INTRAVENOUS
Status: DISCONTINUED | OUTPATIENT
Start: 2025-05-23 | End: 2025-05-23 | Stop reason: HOSPADM

## 2025-05-23 RX ORDER — OXYCODONE HYDROCHLORIDE 5 MG/1
5 TABLET ORAL EVERY 4 HOURS PRN
Status: DISCONTINUED | OUTPATIENT
Start: 2025-05-24 | End: 2025-05-26 | Stop reason: HOSPADM

## 2025-05-23 RX ORDER — IBUPROFEN 800 MG/1
800 TABLET, FILM COATED ORAL EVERY 8 HOURS PRN
Status: DISCONTINUED | OUTPATIENT
Start: 2025-05-27 | End: 2025-05-26 | Stop reason: HOSPADM

## 2025-05-23 RX ORDER — CITRIC ACID/SODIUM CITRATE 334-500MG
30 SOLUTION, ORAL ORAL ONCE
Status: COMPLETED | OUTPATIENT
Start: 2025-05-23 | End: 2025-05-23

## 2025-05-23 RX ORDER — HYDROMORPHONE HYDROCHLORIDE 1 MG/ML
0.1 INJECTION, SOLUTION INTRAMUSCULAR; INTRAVENOUS; SUBCUTANEOUS
Status: DISCONTINUED | OUTPATIENT
Start: 2025-05-23 | End: 2025-05-23 | Stop reason: HOSPADM

## 2025-05-23 RX ORDER — DIPHENHYDRAMINE HYDROCHLORIDE 50 MG/ML
12.5 INJECTION, SOLUTION INTRAMUSCULAR; INTRAVENOUS EVERY 6 HOURS PRN
Status: ACTIVE | OUTPATIENT
Start: 2025-05-23 | End: 2025-05-24

## 2025-05-23 RX ORDER — CEFAZOLIN SODIUM 1 G/3ML
INJECTION, POWDER, FOR SOLUTION INTRAMUSCULAR; INTRAVENOUS PRN
Status: DISCONTINUED | OUTPATIENT
Start: 2025-05-23 | End: 2025-05-23 | Stop reason: SURG

## 2025-05-23 RX ORDER — SODIUM CHLORIDE, SODIUM GLUCONATE, SODIUM ACETATE, POTASSIUM CHLORIDE AND MAGNESIUM CHLORIDE 526; 502; 368; 37; 30 MG/100ML; MG/100ML; MG/100ML; MG/100ML; MG/100ML
1000 INJECTION, SOLUTION INTRAVENOUS ONCE
Status: COMPLETED | OUTPATIENT
Start: 2025-05-23 | End: 2025-05-23

## 2025-05-23 RX ORDER — HALOPERIDOL 5 MG/ML
1 INJECTION INTRAMUSCULAR
Status: DISCONTINUED | OUTPATIENT
Start: 2025-05-23 | End: 2025-05-23 | Stop reason: HOSPADM

## 2025-05-23 RX ORDER — DOCUSATE SODIUM 100 MG/1
100 CAPSULE, LIQUID FILLED ORAL 2 TIMES DAILY PRN
Status: DISCONTINUED | OUTPATIENT
Start: 2025-05-23 | End: 2025-05-26

## 2025-05-23 RX ORDER — VITAMIN A ACETATE, BETA CAROTENE, ASCORBIC ACID, CHOLECALCIFEROL, .ALPHA.-TOCOPHEROL ACETATE, DL-, THIAMINE MONONITRATE, RIBOFLAVIN, NIACINAMIDE, PYRIDOXINE HYDROCHLORIDE, FOLIC ACID, CYANOCOBALAMIN, CALCIUM CARBONATE, FERROUS FUMARATE, ZINC OXIDE, CUPRIC OXIDE 3080; 12; 120; 400; 1; 1.84; 3; 20; 22; 920; 25; 200; 27; 10; 2 [IU]/1; UG/1; MG/1; [IU]/1; MG/1; MG/1; MG/1; MG/1; MG/1; [IU]/1; MG/1; MG/1; MG/1; MG/1; MG/1
1 TABLET, FILM COATED ORAL
Status: DISCONTINUED | OUTPATIENT
Start: 2025-05-23 | End: 2025-05-26 | Stop reason: HOSPADM

## 2025-05-23 RX ORDER — ONDANSETRON 2 MG/ML
INJECTION INTRAMUSCULAR; INTRAVENOUS PRN
Status: DISCONTINUED | OUTPATIENT
Start: 2025-05-23 | End: 2025-05-23 | Stop reason: SURG

## 2025-05-23 RX ORDER — CLINDAMYCIN PHOSPHATE 900 MG/50ML
900 INJECTION, SOLUTION INTRAVENOUS EVERY 8 HOURS
Status: DISCONTINUED | OUTPATIENT
Start: 2025-05-23 | End: 2025-05-24

## 2025-05-23 RX ORDER — DIPHENHYDRAMINE HYDROCHLORIDE 50 MG/ML
12.5 INJECTION, SOLUTION INTRAMUSCULAR; INTRAVENOUS
Status: DISCONTINUED | OUTPATIENT
Start: 2025-05-23 | End: 2025-05-23 | Stop reason: HOSPADM

## 2025-05-23 RX ORDER — HYDROMORPHONE HYDROCHLORIDE 1 MG/ML
0.2 INJECTION, SOLUTION INTRAMUSCULAR; INTRAVENOUS; SUBCUTANEOUS
Status: ACTIVE | OUTPATIENT
Start: 2025-05-23 | End: 2025-05-24

## 2025-05-23 RX ORDER — SCOPOLAMINE 1 MG/3D
PATCH, EXTENDED RELEASE TRANSDERMAL PRN
Status: DISCONTINUED | OUTPATIENT
Start: 2025-05-23 | End: 2025-05-23 | Stop reason: SURG

## 2025-05-23 RX ORDER — IBUPROFEN 800 MG/1
800 TABLET, FILM COATED ORAL EVERY 8 HOURS
Status: DISCONTINUED | OUTPATIENT
Start: 2025-05-24 | End: 2025-05-26 | Stop reason: HOSPADM

## 2025-05-23 RX ORDER — ACETAMINOPHEN 500 MG
1000 TABLET ORAL EVERY 6 HOURS
Status: COMPLETED | OUTPATIENT
Start: 2025-05-23 | End: 2025-05-24

## 2025-05-23 RX ORDER — SIMETHICONE 125 MG
125 TABLET,CHEWABLE ORAL 4 TIMES DAILY PRN
Status: DISCONTINUED | OUTPATIENT
Start: 2025-05-23 | End: 2025-05-26 | Stop reason: HOSPADM

## 2025-05-23 RX ORDER — LIDOCAINE HYDROCHLORIDE 20 MG/ML
INJECTION, SOLUTION EPIDURAL; INFILTRATION; INTRACAUDAL; PERINEURAL PRN
Status: DISCONTINUED | OUTPATIENT
Start: 2025-05-23 | End: 2025-05-23 | Stop reason: SURG

## 2025-05-23 RX ORDER — KETOROLAC TROMETHAMINE 15 MG/ML
INJECTION, SOLUTION INTRAMUSCULAR; INTRAVENOUS PRN
Status: DISCONTINUED | OUTPATIENT
Start: 2025-05-23 | End: 2025-05-23 | Stop reason: SURG

## 2025-05-23 RX ORDER — ACETAMINOPHEN 500 MG
1000 TABLET ORAL ONCE
Status: COMPLETED | OUTPATIENT
Start: 2025-05-23 | End: 2025-05-23

## 2025-05-23 RX ORDER — DEXTROSE, SODIUM CHLORIDE, SODIUM LACTATE, POTASSIUM CHLORIDE, AND CALCIUM CHLORIDE 5; .6; .31; .03; .02 G/100ML; G/100ML; G/100ML; G/100ML; G/100ML
INJECTION, SOLUTION INTRAVENOUS CONTINUOUS
Status: DISCONTINUED | OUTPATIENT
Start: 2025-05-23 | End: 2025-05-26

## 2025-05-23 RX ORDER — HYDROMORPHONE HYDROCHLORIDE 1 MG/ML
0.4 INJECTION, SOLUTION INTRAMUSCULAR; INTRAVENOUS; SUBCUTANEOUS
Status: DISCONTINUED | OUTPATIENT
Start: 2025-05-23 | End: 2025-05-23 | Stop reason: HOSPADM

## 2025-05-23 RX ORDER — HYDROMORPHONE HYDROCHLORIDE 1 MG/ML
0.4 INJECTION, SOLUTION INTRAMUSCULAR; INTRAVENOUS; SUBCUTANEOUS
Status: ACTIVE | OUTPATIENT
Start: 2025-05-23 | End: 2025-05-24

## 2025-05-23 RX ORDER — KETOROLAC TROMETHAMINE 15 MG/ML
15 INJECTION, SOLUTION INTRAMUSCULAR; INTRAVENOUS EVERY 6 HOURS
Status: COMPLETED | OUTPATIENT
Start: 2025-05-23 | End: 2025-05-24

## 2025-05-23 RX ORDER — ACETAMINOPHEN 500 MG
1000 TABLET ORAL EVERY 6 HOURS
Status: DISCONTINUED | OUTPATIENT
Start: 2025-05-24 | End: 2025-05-26 | Stop reason: HOSPADM

## 2025-05-23 RX ORDER — SODIUM CHLORIDE, SODIUM LACTATE, POTASSIUM CHLORIDE, CALCIUM CHLORIDE 600; 310; 30; 20 MG/100ML; MG/100ML; MG/100ML; MG/100ML
2000 INJECTION, SOLUTION INTRAVENOUS PRN
Status: DISCONTINUED | OUTPATIENT
Start: 2025-05-23 | End: 2025-05-26 | Stop reason: HOSPADM

## 2025-05-23 RX ORDER — ACETAMINOPHEN 500 MG
1000 TABLET ORAL EVERY 6 HOURS PRN
Status: DISCONTINUED | OUTPATIENT
Start: 2025-05-27 | End: 2025-05-26 | Stop reason: HOSPADM

## 2025-05-23 RX ORDER — SODIUM CHLORIDE, SODIUM GLUCONATE, SODIUM ACETATE, POTASSIUM CHLORIDE AND MAGNESIUM CHLORIDE 526; 502; 368; 37; 30 MG/100ML; MG/100ML; MG/100ML; MG/100ML; MG/100ML
INJECTION, SOLUTION INTRAVENOUS
Status: DISCONTINUED | OUTPATIENT
Start: 2025-05-23 | End: 2025-05-23 | Stop reason: SURG

## 2025-05-23 RX ORDER — BISACODYL 10 MG
10 SUPPOSITORY, RECTAL RECTAL PRN
Status: DISCONTINUED | OUTPATIENT
Start: 2025-05-23 | End: 2025-05-26

## 2025-05-23 RX ORDER — ONDANSETRON 4 MG/1
4 TABLET, ORALLY DISINTEGRATING ORAL EVERY 6 HOURS PRN
Status: DISCONTINUED | OUTPATIENT
Start: 2025-05-24 | End: 2025-05-26 | Stop reason: HOSPADM

## 2025-05-23 RX ORDER — TRANEXAMIC ACID 100 MG/ML
INJECTION, SOLUTION INTRAVENOUS PRN
Status: DISCONTINUED | OUTPATIENT
Start: 2025-05-23 | End: 2025-05-23 | Stop reason: SURG

## 2025-05-23 RX ORDER — LIDOCAINE HYDROCHLORIDE 10 MG/ML
INJECTION, SOLUTION EPIDURAL; INFILTRATION; INTRACAUDAL; PERINEURAL PRN
Status: DISCONTINUED | OUTPATIENT
Start: 2025-05-23 | End: 2025-05-23 | Stop reason: SURG

## 2025-05-23 RX ORDER — ONDANSETRON 2 MG/ML
4 INJECTION INTRAMUSCULAR; INTRAVENOUS EVERY 6 HOURS PRN
Status: ACTIVE | OUTPATIENT
Start: 2025-05-23 | End: 2025-05-24

## 2025-05-23 RX ORDER — MORPHINE SULFATE 0.5 MG/ML
INJECTION, SOLUTION EPIDURAL; INTRATHECAL; INTRAVENOUS PRN
Status: DISCONTINUED | OUTPATIENT
Start: 2025-05-23 | End: 2025-05-23 | Stop reason: SURG

## 2025-05-23 RX ORDER — OXYCODONE HYDROCHLORIDE 5 MG/1
5 TABLET ORAL EVERY 4 HOURS PRN
Status: ACTIVE | OUTPATIENT
Start: 2025-05-23 | End: 2025-05-24

## 2025-05-23 RX ORDER — ONDANSETRON 2 MG/ML
4 INJECTION INTRAMUSCULAR; INTRAVENOUS EVERY 6 HOURS PRN
Status: DISCONTINUED | OUTPATIENT
Start: 2025-05-24 | End: 2025-05-26 | Stop reason: HOSPADM

## 2025-05-23 RX ORDER — CALCIUM CARBONATE 500 MG/1
1000 TABLET, CHEWABLE ORAL EVERY 6 HOURS PRN
Status: DISCONTINUED | OUTPATIENT
Start: 2025-05-24 | End: 2025-05-26 | Stop reason: HOSPADM

## 2025-05-23 RX ORDER — DIPHENHYDRAMINE HCL 25 MG
25 TABLET ORAL EVERY 6 HOURS PRN
Status: DISCONTINUED | OUTPATIENT
Start: 2025-05-24 | End: 2025-05-26 | Stop reason: HOSPADM

## 2025-05-23 RX ORDER — AZITHROMYCIN 500 MG/5ML
500 INJECTION, POWDER, LYOPHILIZED, FOR SOLUTION INTRAVENOUS ONCE
Status: COMPLETED | OUTPATIENT
Start: 2025-05-23 | End: 2025-05-23

## 2025-05-23 RX ORDER — BUPIVACAINE HYDROCHLORIDE 7.5 MG/ML
INJECTION, SOLUTION INTRASPINAL PRN
Status: DISCONTINUED | OUTPATIENT
Start: 2025-05-23 | End: 2025-05-23 | Stop reason: SURG

## 2025-05-23 RX ORDER — OXYCODONE HYDROCHLORIDE 10 MG/1
10 TABLET ORAL EVERY 4 HOURS PRN
Status: DISCONTINUED | OUTPATIENT
Start: 2025-05-24 | End: 2025-05-26 | Stop reason: HOSPADM

## 2025-05-23 RX ORDER — HYDROMORPHONE HYDROCHLORIDE 1 MG/ML
0.2 INJECTION, SOLUTION INTRAMUSCULAR; INTRAVENOUS; SUBCUTANEOUS
Status: DISCONTINUED | OUTPATIENT
Start: 2025-05-23 | End: 2025-05-23 | Stop reason: HOSPADM

## 2025-05-23 RX ADMIN — SCOPOLAMINE 1 PATCH: 1.5 PATCH, EXTENDED RELEASE TRANSDERMAL at 13:14

## 2025-05-23 RX ADMIN — KETOROLAC TROMETHAMINE 15 MG: 15 INJECTION, SOLUTION INTRAMUSCULAR; INTRAVENOUS at 20:22

## 2025-05-23 RX ADMIN — CLINDAMYCIN IN 5 PERCENT DEXTROSE 900 MG: 18 INJECTION, SOLUTION INTRAVENOUS at 18:18

## 2025-05-23 RX ADMIN — LIDOCAINE HYDROCHLORIDE 100 MG: 10 INJECTION, SOLUTION EPIDURAL; INFILTRATION; INTRACAUDAL; PERINEURAL at 02:27

## 2025-05-23 RX ADMIN — AMPICILLIN SODIUM 2000 MG: 2 INJECTION, POWDER, FOR SOLUTION INTRAVENOUS at 07:04

## 2025-05-23 RX ADMIN — SODIUM CITRATE AND CITRIC ACID MONOHYDRATE 30 ML: 2004; 3000 SOLUTION ORAL at 12:37

## 2025-05-23 RX ADMIN — AMPICILLIN SODIUM 2000 MG: 2 INJECTION, POWDER, FOR SOLUTION INTRAVENOUS at 01:13

## 2025-05-23 RX ADMIN — AMPICILLIN SODIUM 2000 MG: 2 INJECTION, POWDER, FOR SOLUTION INTRAVENOUS at 20:23

## 2025-05-23 RX ADMIN — ROPIVACAINE HYDROCHLORIDE: 2 INJECTION, SOLUTION EPIDURAL; INFILTRATION; PERINEURAL at 01:06

## 2025-05-23 RX ADMIN — ACETAMINOPHEN 1000 MG: 500 TABLET ORAL at 01:10

## 2025-05-23 RX ADMIN — TRANEXAMIC ACID 1000 MG: 100 INJECTION, SOLUTION INTRAVENOUS at 13:35

## 2025-05-23 RX ADMIN — SODIUM CHLORIDE, SODIUM GLUCONATE, SODIUM ACETATE, POTASSIUM CHLORIDE AND MAGNESIUM CHLORIDE: 526; 502; 368; 37; 30 INJECTION, SOLUTION INTRAVENOUS at 12:44

## 2025-05-23 RX ADMIN — AMPICILLIN SODIUM 2000 MG: 2 INJECTION, POWDER, FOR SOLUTION INTRAVENOUS at 14:21

## 2025-05-23 RX ADMIN — SODIUM CHLORIDE, SODIUM GLUCONATE, SODIUM ACETATE, POTASSIUM CHLORIDE AND MAGNESIUM CHLORIDE 1000 ML: 526; 502; 368; 37; 30 INJECTION, SOLUTION INTRAVENOUS at 12:38

## 2025-05-23 RX ADMIN — LIDOCAINE HYDROCHLORIDE 5 ML: 20 INJECTION, SOLUTION EPIDURAL; INFILTRATION; INTRACAUDAL; PERINEURAL at 12:44

## 2025-05-23 RX ADMIN — MORPHINE SULFATE 100 MCG: 0.5 INJECTION, SOLUTION EPIDURAL; INTRATHECAL; INTRAVENOUS at 13:13

## 2025-05-23 RX ADMIN — CEFAZOLIN 2 G: 1 INJECTION, POWDER, FOR SOLUTION INTRAMUSCULAR; INTRAVENOUS at 13:01

## 2025-05-23 RX ADMIN — BUPIVACAINE HYDROCHLORIDE IN DEXTROSE 1.5 ML: 7.5 INJECTION, SOLUTION SUBARACHNOID at 13:13

## 2025-05-23 RX ADMIN — METOCLOPRAMIDE 10 MG: 5 INJECTION, SOLUTION INTRAMUSCULAR; INTRAVENOUS at 12:38

## 2025-05-23 RX ADMIN — OXYTOCIN 125 ML/HR: 10 INJECTION, SOLUTION INTRAMUSCULAR; INTRAVENOUS at 14:53

## 2025-05-23 RX ADMIN — AZITHROMYCIN DIHYDRATE 500 MG: 500 INJECTION, POWDER, LYOPHILIZED, FOR SOLUTION INTRAVENOUS at 12:37

## 2025-05-23 RX ADMIN — GENTAMICIN SULFATE 390 MG: 40 INJECTION, SOLUTION INTRAMUSCULAR; INTRAVENOUS at 01:48

## 2025-05-23 RX ADMIN — ONDANSETRON 4 MG: 2 INJECTION INTRAMUSCULAR; INTRAVENOUS at 13:45

## 2025-05-23 RX ADMIN — KETOROLAC TROMETHAMINE 15 MG: 15 INJECTION, SOLUTION INTRAMUSCULAR; INTRAVENOUS at 13:45

## 2025-05-23 RX ADMIN — FAMOTIDINE 20 MG: 10 INJECTION, SOLUTION INTRAVENOUS at 12:38

## 2025-05-23 RX ADMIN — FENTANYL CITRATE 100 MCG: 50 INJECTION, SOLUTION INTRAMUSCULAR; INTRAVENOUS at 02:27

## 2025-05-23 RX ADMIN — OXYTOCIN 500 ML: 10 INJECTION, SOLUTION INTRAMUSCULAR; INTRAVENOUS at 13:52

## 2025-05-23 RX ADMIN — ROPIVACAINE HYDROCHLORIDE: 2 INJECTION, SOLUTION EPIDURAL; INFILTRATION; PERINEURAL at 07:17

## 2025-05-23 RX ADMIN — ACETAMINOPHEN 1000 MG: 500 TABLET ORAL at 18:16

## 2025-05-23 RX ADMIN — SODIUM CHLORIDE, SODIUM LACTATE, POTASSIUM CHLORIDE, CALCIUM CHLORIDE AND DEXTROSE MONOHYDRATE: 5; 600; 310; 30; 20 INJECTION, SOLUTION INTRAVENOUS at 11:11

## 2025-05-23 ASSESSMENT — PAIN DESCRIPTION - PAIN TYPE
TYPE: ACUTE PAIN
TYPE: ACUTE PAIN;SURGICAL PAIN
TYPE: ACUTE PAIN
TYPE: ACUTE PAIN;SURGICAL PAIN

## 2025-05-23 ASSESSMENT — PAIN SCALES - GENERAL: PAIN_LEVEL: 0

## 2025-05-23 NOTE — PROGRESS NOTES
"Pharmacy Gentamicin Kinetics Note for 2025     24 y.o. female on Gentamicin day # 1    Gentamicin Indication: Intra-abdominal infection     Provider specified end date: 25    Active Antibiotics (From admission, onward)      Ordered     Ordering Provider       Fri May 23, 2025  1:08 AM    25 0108  gentamicin (Garamycin) 390 mg in  mL IVPB  EVERY 24 HOURS         HALIE Francisco       Fri May 23, 2025 12:46 AM    25 0046  ampicillin (Omnipen) 2,000 mg in  mL IVPB  EVERY 6 HOURS         HALIE Francisco    25 0046  MD Alert...Gentamicin per Pharmacy  PHARMACY TO DOSE        Question:  Indication(s) for aminoglycoside?  Answer:  Intra-abdominal infection    HALIE Francisco            Dosing Weight: 78.4 kg (172 lb 13.5 oz)    Admission History: Admitted on 2025 for Indication for care in labor or delivery [O75.9]   Pertinent history: Temp 38.2 on arrival. WBC 12.9. MD starting Amp/Gent    Allergies:     Patient has no known allergies.     Pertinent cultures to date:      Results       ** No results found for the last 336 hours. **            Labs:    CrCl cannot be calculated (No successful lab value found.).  Recent Labs     25  1100   WBC 12.9*   NEUTSPOLYS 70.60     No results for input(s): \"BUN\", \"CREATININE\", \"ALBUMIN\" in the last 72 hours.  No results for input(s): \"GENTTROUGH\", \"GENTPEAK\", \"GENTRANDOM\" in the last 72 hours.    Intake/Output Summary (Last 24 hours) at 2025 0504  Last data filed at 2025 0200  Gross per 24 hour   Intake 1818.58 ml   Output 750 ml   Net 1068.58 ml     /66   Pulse 80   Temp 36.6 °C (97.9 °F) (Temporal)   Resp 17   Ht 1.676 m (5' 6\")   Wt 97.5 kg (215 lb)   SpO2 98%  Temp (24hrs), Av.8 °C (98.3 °F), Min:36.1 °C (97 °F), Max:38.2 °C (100.7 °F)      List concerns for Gentamicin clearance:     None    A/P:     - Gentamicin dose: 390 mg every 24 hours for 2 days    - Next gentamicin level: " None ordered at this time. Likely obtain no levels unless clinical status or duration of therapy changes.      - Goal trough: Undetectable    - Comments: Pharmacy will continue to follow patient     Tatiana Rubio, BradyD

## 2025-05-23 NOTE — CARE PLAN
The patient is Watcher - Medium risk of patient condition declining or worsening    Shift Goals  Clinical Goals:   Patient Goals: pain control  Family Goals: education and support    Progress made toward(s) clinical / shift goals:    Problem: Knowledge Deficit - L&D  Goal: Patient and family/caregivers will demonstrate understanding of plan of care, disease process/condition, diagnostic tests and medications  Outcome: Progressing  Note: Patient continually updated on POC. Patient educated on the labor process, basic fetal heart tones, the pushing process, and expectations in labor. Patient verbalizes understanding and asks questions as they arise.      Problem: Pain  Goal: Patient's pain will be alleviated or reduced to the patient’s comfort goal  Outcome: Progressing  Note: Patient resting comfortably with use of the epidural. Patient educated on the bolus button and pain expectations. Patient given additional bolus per Dr Peoples for breakthrough pain.        Patient is not progressing towards the following goals:

## 2025-05-23 NOTE — OP REPORT
SECTION OPERATIVE NOTE    Patient Name: Crystal Grewal  YOB: 2000  MRN: [id number]    DATE OF SURGERY: 2025    PREOPERATIVE DIAGNOSIS:   Intrauterine pregnancy at 41w1d gestation  Arrest of descent  Chorioamnionitis   OP presentation    POSTOPERATIVE DIAGNOSIS:   Intrauterine pregnancy at 41w1d gestation  Arrest of descent  Chorioamnionitis   OP presentation    PROCEDURE: Low transverse  section     SURGEON: John Damon MD     ASSISTANT: Sherwin Chua DO    ANESTHESIA: Spinal     PREOPERATIVE ANTIBIOTICS: IV Cefazolin 2g and 500mg azithromycin     UTEROTONIC AGENTS: Pitocin    INTRAOPERATIVE BLOOD PRODUCTS: N/A    ESTIMATED BLOOD LOSS:  500 mL           TOTAL IV FLUIDS: 700 mL of lactated ringers    URINE OUTPUT: 100 mL of clear yellow urine           SPECIMEN:  Cord blood  Placenta           COMPLICATIONS:  None; patient tolerated the procedure well.           CONDITION: stable    FINDINGS: Viable female infant in vertex presentation, OP position. Apgars were 8 and 8. Weight is pending. Normal appearing uterine outline, tubes, and ovaries.     DESCRIPTION OF PROCEDURE:  The patient was taken to the operating room, identified as Crystal Grewal and the procedure verified as a  Delivery.  A Time Out was held and the above information was confirmed.      The spinal anesthesia was administered without difficulty and antibiotics were given for infection prophylaxis. She was placed in the dorsal supine position with left upward tilt and prepped and draped in the normal sterile fashion. Spinal anesthetic was found to be adequate. A Pfannenstiel incision was made with the scalpel and carried down through the subcutaneous tissue to the fascia using the bovie. The fascia was incised in the midline with the scalpel and extended laterally with curved mendoza scissors. The superior aspect of the fascial incision was grasped with Kocher clamps, and the underlying rectus  muscle was dissected off sharply with the mendoza scissors.  In a similar fashion, the inferior aspect of the rectus muscle and pyramidalis were dissected off. Hemostasis was achieved with the bovie. The rectus muscle was bluntly  in the midline down to the level of the pubic symphysis. Pre-peritoneal fatty tissue was bluntly dissected to expose the peritoneum.The underlying peritoneum was found to be free of adherent bowel,entered bluntly, and extended with blunt retraction.     The Gerardo retractor was placed. Intraabdominal survey revealed scant, clear peritoneal fluid and the thinned-out lower uterine segment. The utero-vesical peritoneal reflection was incised transversely with Metzenbaum scissors and the bladder flap was bluntly freed from the lower uterine segment. A low transverse uterine incision was made and extended bluntly. Viable female infant was found to be in vertex presentation. The head was elevated out of pelvis with special attention paid to avoid using the uterine incision as a fulcrum. Gentle fundal pressure was applied once the head was brought into the incision. The infant was delivered without difficulty.  The mouth and nose were suctioned with a bulb. The umbilical cord was doubly clamped and cut and the infant was handed off to the awaiting nurses. The cord gases were obtained for evaluation.  IV oxytocin was initiated to facilitate uterine contractions. The placenta was delivered intact with massage of the uterine fundus. The uterus was delivered. The inside of the uterus was gently wiped with a lap sponge to assure complete removal of placenta membranes and clots.      The uterine outline, tubes and ovaries appeared normal. The uterine incision was grasped with circumferentially with Allis clamps and closed with 0-vicryl in a continuous running locked fashion. Hemostasis was observed. The uterus was replaced into its anatomic position. The Gerardo retractor was removed. The gutters  were inspected and cleared of all clots and debris. The fascia was then reapproximated with 0-vicryl in a continuous running fashion. The subcutaneous tissue was reapproximated with 2-0 vicryl. The skin was closed with 4-0 monocryl. The uterus was evacuated with fundal pressure. Pressure dressing was applied.    Instrument, sponge, and needle counts were correct prior the abdominal closure and at the conclusion of the case.  The patient was taken to recovery in stable condition.    John Damon M.D.  Obstetrics and Gynecology  5/23/2025      2:01 PM

## 2025-05-23 NOTE — CARE PLAN
The patient is Stable - Low risk of patient condition declining or worsening    Shift Goals  Clinical Goals: cervical dilation and effacement  Patient Goals: healthy mom healthy baby  Family Goals: adequate support of pt and baby    Progress made toward(s) clinical / shift goals:      Problem: Risk for Infection and Impaired Wound Healing  Goal: Patient will remain free from infection  Outcome: Progressing  Note: Pt will remain afebrile, and will show no s/s of infection.     Problem: Pain  Goal: Patient's pain will be alleviated or reduced to the patient’s comfort goal  Outcome: Progressing  Note: Pt continuously monitored for pain, educated on pain management options. Call light within reach, pt understands to call for RN regarding any needs or concerns.

## 2025-05-23 NOTE — ANESTHESIA TIME REPORT
Anesthesia Start and Stop Event Times       Date Time Event    5/22/2025 1651 Ready for Procedure     1651 Anesthesia Start    5/23/2025 1355 Anesthesia Stop          Responsible Staff  05/22/25 to 05/23/25      Name Role Begin End    Christofer Peoples M.D. Anesth 1651 [id number]    Candice Cummings M.D. Anesth [id number]          Overtime Reason:  no overtime (within assigned shift)    Comments:

## 2025-05-23 NOTE — ANESTHESIA PROCEDURE NOTES
Spinal Block    Date/Time: 5/23/2025 1:13 PM    Performed by: Candice Cummings M.D.  Authorized by: Candice Cummings M.D.    Start Time:  5/23/2025 1:13 PM  Reason for Block: primary anesthetic    patient identified, IV checked, site marked, risks and benefits discussed, surgical consent, monitors and equipment checked, pre-op evaluation and timeout performed    Patient Position:  Sitting  Prep: ChloraPrep, patient draped and sterile technique    Monitoring:  Blood pressure, continuous pulse oximetry and heart rate  Approach:  Midline  Location:  L3-4  Injection Technique:  Single-shot  Skin infiltration:  Lidocaine  Strength:  1%  Dose:  3ml  Needle Type:  Pencan  Needle Gauge:  25 G  CSF flowing pre/post injection:  Yes  Sensory Level:  T4

## 2025-05-23 NOTE — ANESTHESIA PREPROCEDURE EVALUATION
Date: 05/22/25  Procedure: Labor Epidural         Relevant Problems   No relevant active problems       Physical Exam    Airway   Mallampati: II  TM distance: >3 FB  Neck ROM: full       Cardiovascular - normal exam  Rhythm: regular  Rate: normal    (-) murmur     Dental - normal exam           Pulmonary - normal examBreath sounds clear to auscultation     Abdominal    Neurological - normal exam                   Anesthesia Plan    ASA 2       Plan - epidural   Neuraxial block will be labor analgesia                  Pertinent diagnostic labs and testing reviewed    Informed Consent:    Anesthetic plan and risks discussed with patient.

## 2025-05-23 NOTE — ANESTHESIA POSTPROCEDURE EVALUATION
Patient: Crystal Grewal    Procedure Summary       Date: 25 Room / Location: [facility] OR 01 / SURGERY LABOR AND DELIVERY    Anesthesia Start:  Anesthesia Stop: 25 1355    Procedure:  SECTION, PRIMARY (Abdomen) Diagnosis: (Arrest of Descent)    Surgeons: John Damon M.D. Responsible Provider: Candice Cummings M.D.    Anesthesia Type: epidural ASA Status: 2            Final Anesthesia Type: epidural  Last vitals  BP   Blood Pressure: 124/77    Temp   36.7 °C (98 °F)    Pulse   96   Resp   16    SpO2   97 %      Anesthesia Post Evaluation    Patient location during evaluation: PACU  Patient participation: complete - patient participated  Level of consciousness: awake and alert  Pain score: 0    Airway patency: patent  Anesthetic complications: no  Cardiovascular status: hemodynamically stable  Respiratory status: acceptable  Hydration status: euvolemic    PONV: none          No notable events documented.

## 2025-05-23 NOTE — PROGRESS NOTES
1855: Report received from Aracelis SEYMOUR. POC discussed. Patient denies HA, SOB, vision changes, or VB. All questions answered and all needs met at this time. Call light within reach. Care assumed.     0700: Report given to Esha SEYMOUR. POC discussed. All questions answered and all needs met. Care relinquished.

## 2025-05-23 NOTE — PROGRESS NOTES
Patient seen and evaluated at bedside.  was used. I've been monitoring her progress, and she's made minimal descent. Baby was found to be OP and I was able to rotate baby to OA. Despite this, minimal progress has been made and she has been pushing off and on for the last 4 hours. The risks, benefits and alternatives of  were discussed.  The patient was recommend a  section due to arrest of descent. Her Hg is 11.3 and the placenta is anterior. She has not signed a tubal ligation consent. The risks of  section include DVT/pulmonary embolism, pelvic scarring, pelvic pain, infection, bleeding, scarring, injury to bowel, bladder, ureters or blood vessels, anesthesia complications, injury to fetus and death. Future consideration for repeat  section vs trial of labor after  were also mentioned.  I also discussed with the patient the risk of wound infection and wound breakdown. We discussed that these risks are greater in people that have diabetes or obesity. I also discussed the risk of emergency blood transfusion during procedure as well as emergency hysterectomy during procedure. Patient had the opportunity to ask questions regarding procedures. All questions answered to the patient's satisfaction. Pt agrees to proceed with surgery.    John Damon M.D.

## 2025-05-23 NOTE — ANESTHESIA PROCEDURE NOTES
Epidural Block    Date/Time: 5/22/2025 4:51 PM    Performed by: Christofer Peoples M.D.  Authorized by: Christofer Peoples M.D.    Patient Location:  OB  Start Time:  5/22/2025 4:51 PM  End Time:  5/22/2025 5:05 PM  Reason for Block: labor analgesia    patient identified, IV checked, site marked, risks and benefits discussed, surgical consent, monitors and equipment checked, pre-op evaluation and timeout performed    Patient Position:  Sitting  Prep: ChloraPrep, patient draped and sterile technique    Monitoring:  Blood pressure, continuous pulse oximetry and heart rate  Approach:  Midline  Location:  L3-L4  Injection Technique:  MARILIA air  Skin infiltration:  Lidocaine  Strength:  1%  Dose:  3ml  Needle Type:  Tuohy  Needle Gauge:  17 G  Needle Length:  3.5 in  Loss of resistance::  8  Catheter Size:  19 G  Catheter at Skin Depth:  15  Test Dose Result:  Negative

## 2025-05-24 LAB
ERYTHROCYTE [DISTWIDTH] IN BLOOD BY AUTOMATED COUNT: 45.8 FL (ref 35.9–50)
HCT VFR BLD AUTO: 29 % (ref 37–47)
HGB BLD-MCNC: 9.3 G/DL (ref 12–16)
MCH RBC QN AUTO: 26.6 PG (ref 27–33)
MCHC RBC AUTO-ENTMCNC: 32.1 G/DL (ref 32.2–35.5)
MCV RBC AUTO: 83.1 FL (ref 81.4–97.8)
PLATELET # BLD AUTO: 254 K/UL (ref 164–446)
PMV BLD AUTO: 11 FL (ref 9–12.9)
RBC # BLD AUTO: 3.49 M/UL (ref 4.2–5.4)
WBC # BLD AUTO: 19.4 K/UL (ref 4.8–10.8)

## 2025-05-24 PROCEDURE — 700111 HCHG RX REV CODE 636 W/ 250 OVERRIDE (IP): Mod: JZ | Performed by: OBSTETRICS & GYNECOLOGY

## 2025-05-24 PROCEDURE — 85027 COMPLETE CBC AUTOMATED: CPT

## 2025-05-24 PROCEDURE — A9270 NON-COVERED ITEM OR SERVICE: HCPCS | Performed by: ANESTHESIOLOGY

## 2025-05-24 PROCEDURE — 770002 HCHG ROOM/CARE - OB PRIVATE (112)

## 2025-05-24 PROCEDURE — 36415 COLL VENOUS BLD VENIPUNCTURE: CPT

## 2025-05-24 PROCEDURE — A9270 NON-COVERED ITEM OR SERVICE: HCPCS | Performed by: OBSTETRICS & GYNECOLOGY

## 2025-05-24 PROCEDURE — 700111 HCHG RX REV CODE 636 W/ 250 OVERRIDE (IP): Performed by: ADVANCED PRACTICE MIDWIFE

## 2025-05-24 PROCEDURE — 700111 HCHG RX REV CODE 636 W/ 250 OVERRIDE (IP): Mod: JZ | Performed by: ANESTHESIOLOGY

## 2025-05-24 PROCEDURE — 700102 HCHG RX REV CODE 250 W/ 637 OVERRIDE(OP): Performed by: ANESTHESIOLOGY

## 2025-05-24 PROCEDURE — 700105 HCHG RX REV CODE 258: Performed by: ADVANCED PRACTICE MIDWIFE

## 2025-05-24 PROCEDURE — 700102 HCHG RX REV CODE 250 W/ 637 OVERRIDE(OP): Performed by: OBSTETRICS & GYNECOLOGY

## 2025-05-24 RX ADMIN — ACETAMINOPHEN 1000 MG: 500 TABLET ORAL at 18:27

## 2025-05-24 RX ADMIN — CLINDAMYCIN IN 5 PERCENT DEXTROSE 900 MG: 18 INJECTION, SOLUTION INTRAVENOUS at 00:14

## 2025-05-24 RX ADMIN — KETOROLAC TROMETHAMINE 15 MG: 15 INJECTION, SOLUTION INTRAMUSCULAR; INTRAVENOUS at 02:29

## 2025-05-24 RX ADMIN — GENTAMICIN SULFATE 390 MG: 40 INJECTION, SOLUTION INTRAMUSCULAR; INTRAVENOUS at 02:29

## 2025-05-24 RX ADMIN — IBUPROFEN 800 MG: 800 TABLET, FILM COATED ORAL at 22:32

## 2025-05-24 RX ADMIN — KETOROLAC TROMETHAMINE 15 MG: 15 INJECTION, SOLUTION INTRAMUSCULAR; INTRAVENOUS at 15:00

## 2025-05-24 RX ADMIN — ACETAMINOPHEN 1000 MG: 500 TABLET ORAL at 00:12

## 2025-05-24 RX ADMIN — AMPICILLIN SODIUM 2000 MG: 2 INJECTION, POWDER, FOR SOLUTION INTRAVENOUS at 02:59

## 2025-05-24 RX ADMIN — ACETAMINOPHEN 1000 MG: 500 TABLET ORAL at 12:48

## 2025-05-24 RX ADMIN — KETOROLAC TROMETHAMINE 15 MG: 15 INJECTION, SOLUTION INTRAMUSCULAR; INTRAVENOUS at 09:05

## 2025-05-24 RX ADMIN — PRENATAL WITH FERROUS FUM AND FOLIC ACID 1 TABLET: 3080; 920; 120; 400; 22; 1.84; 3; 20; 10; 1; 12; 200; 27; 25; 2 TABLET ORAL at 09:09

## 2025-05-24 RX ADMIN — ACETAMINOPHEN 1000 MG: 500 TABLET ORAL at 05:36

## 2025-05-24 ASSESSMENT — PAIN DESCRIPTION - PAIN TYPE
TYPE: ACUTE PAIN;SURGICAL PAIN
TYPE: SURGICAL PAIN
TYPE: ACUTE PAIN;SURGICAL PAIN
TYPE: SURGICAL PAIN
TYPE: SURGICAL PAIN
TYPE: ACUTE PAIN
TYPE: SURGICAL PAIN

## 2025-05-24 NOTE — PROGRESS NOTES
1645  Patient transferred to postpartum unit at 1630. Received report from LION Balbuena. Orieneted patient to room and discussed POC for MOB and infant. Patient was assessed, fundus firm, lochia light, rubra. Call light within reach, bed in lowest position. Patient denies any needs at this time. Encouraged to call if any needs arise.

## 2025-05-24 NOTE — CARE PLAN
The patient is Stable - Low risk of patient condition declining or worsening    Shift Goals  Clinical Goals: Fundus firm; ambulate  Patient Goals: bond with infant  Family Goals: adequate support of pt and baby    Progress made toward(s) clinical / shift goals:    Problem: Knowledge Deficit - Postpartum  Goal: Patient will verbalize and demonstrate understanding of self and infant care  Outcome: Progressing     Problem: Psychosocial - Postpartum  Goal: Patient will verbalize and demonstrate effective bonding and parenting behavior  Outcome: Progressing     Problem: Altered Physiologic Condition  Goal: Patient physiologically stable as evidenced by normal lochia, palpable uterine involution and vitals within normal limits  Outcome: Progressing       Patient is not progressing towards the following goals:

## 2025-05-24 NOTE — PROGRESS NOTES
0650-Report received from Eileen SEYMOUR. POC discussed, VS taken. All questions have been answered at this time, care assumed.    0713-SVE as charted.     0719-This RN telephoned [facility] and updated on pt status/SVE, POC discussed.    0740-Orders to begin pushing from [facility].    0805-This RN telephoned Dr. Damon and updated on pt status/pushing, provider to come to bedside to evaluate.    0833-This RN and Dr. Damon at bedside, updated on Fht/ctx, pushing efforts evaluated by provider, POC discussed, orders to pause pushing, RN to perform position changes and to resume pushing.    0935-This RN, Dr. Chua, and Dr. Damon at bedside with US, internal rotation performed by Dr. Damon, positioning confirmed via US, orders to resume pushing.    0955-Resume pushing with this RN continuously at bedside.    1039-This RN and Dr. Chua at bedside, updated on pt status/FHT/pushing efforts, POC discussed.    1118-This RN and Dr. Damon at bedside, updated on pt status/Fht/ctx/pushing efforts, pushing efforts evaluated by provider, POC discussed.    1226-This RN and Dr. Damon at bedside, pushing efforts evaluated by provider, pt consented for P C/S d/t arrest of descent, pt agreeable to plan, see provider note for details.    1329-P C/S delivery of viable baby girl, APGARs 8/8, labs and placenta sent.    1630-Bedside report given to Sandra SEYMOUR. POC discussed, fundal rub performed, bands verified x2. All questions have been answered at this time, care relinquished.

## 2025-05-24 NOTE — LACTATION NOTE
This note was copied from a baby's chart.  Initial Consult:     History:   pc/s at 41w+1d. Maternal hx of chorio during labor.      History of BF: none. This is first baby.     Report of Current Breastfeeding Status: MOB reported she is latching independently and providing bottle.     Breastfeeding Assistance: MOB declines assistance with breastfeeding.      Recommended if bottle supplementation is desired by MOB, continue to offer breast at least 8x in 24hrs, no time limitations at breast.  Reviewed supplemental feeding guidelines and paced bottle feeding.     Provided breastfeeding education on: hunger cues, frequency/duration of breastfeeds, skin to skin, cluster feeding, shallow vs deep latch, and nutritive vs non-nutritive suck.        Plan: Continue to offer infant the breast per feeding cues for a minimum of 8 or more feeds in a 24 hour period.  Frequent skin to skin as MOB awake and attentive.      Established with [facility], encouraged to call and report delivery

## 2025-05-24 NOTE — PROGRESS NOTES
Postpartum Progress Note    Crystal Grewal is a 24 y.o. now  on post-op day #1 following primary  delivery on 2025 at 1329 due to arrest of descent, chorioamnionitis.    Subjective:   Pt reports overall feeling well.   Patient is meeting postpartum milestones.   Pain is well controlled with pain medication.    Pt is ambulating independently.   Pt has voided spontaneously.  Pt has passed gas.  Pt has not yet had bowel movement.  Pt is tolerating oral intake.    Lochia is light.    Infant is feeding via breastmilk and formula, reports this is going well.    Nursing concerns: none    Review of Systems:  GEN: denies fever or chills  CV: denies chest pain or palpitations  RESP: denies cough or shortness of breath  ABD: denies abd pain, denies nausea/vomiting  SKIN: denies rash  : lochia is light    OBJECTIVE:    Patient Vitals for the past 24 hrs:   BP Temp Temp src Pulse Resp SpO2   25 0415 -- -- -- 76 18 96 %   25 0200 108/76 36.8 °C (98.3 °F) Temporal 72 18 98 %   25 0030 -- -- -- 86 17 96 %   25 2300 -- -- -- 73 18 96 %   25 2200 97/59 36.7 °C (98.1 °F) Temporal 70 16 97 %   25 2100 -- -- -- 77 18 96 %   25 2000 -- -- -- 74 17 97 %   25 1900 -- -- -- 71 18 96 %   25 1803 111/73 36.8 °C (98.3 °F) Temporal 74 20 94 %   25 1700 -- -- -- 75 20 97 %   25 1600 110/55 36.8 °C (98.2 °F) Temporal 94 (!) 22 95 %   25 1530 117/80 36.6 °C (97.8 °F) Temporal 77 18 95 %   25 1500 123/88 36.7 °C (98 °F) Temporal 93 (!) 30 96 %   25 1445 111/73 36.7 °C (98 °F) Temporal 83 18 96 %   25 1430 113/71 36.6 °C (97.8 °F) Temporal 87 20 95 %   25 1415 106/65 36.3 °C (97.3 °F) Temporal 89 (!) 31 96 %   25 1400 99/57 37 °C (98.6 °F) Temporal 96 20 96 %   25 1257 126/83 -- -- (!) 110 -- --   25 1256 -- -- -- (!) 116 -- 96 %   25 1100 -- 36.8 °C (98.3 °F) Oral -- -- --   25 0900 -- 36.2 °C  (97.2 °F) Oral -- -- --   05/23/25 0744 124/77 -- -- 96 -- --   05/23/25 0700 -- -- -- 93 -- 97 %   05/23/25 0654 101/61 36.7 °C (98 °F) Temporal 86 16 97 %       Physical Exam:  Gen: NAD, Alert, conversant  CV: +S1S2, RRR, cap refill <2 sec, trace BLE edema  Resp: CTAB, breathing comfortably on room air  Abd: soft, ND, appropriately tender to palpation, no rebound/guarding, +BS;   : fundus palpable below umbilicus,  Incision: Mepilex in place, C/D/I  Ext: moving all extremities    Meds: Current Medications[1]    Lab:   Recent Results (from the past 48 hours)   HOLD BLOOD BANK SPECIMEN (NOT TESTED)    Collection Time: 05/22/25 11:00 AM   Result Value Ref Range    Holding Tube - Bb DONE    CBC with differential    Collection Time: 05/22/25 11:00 AM   Result Value Ref Range    WBC 12.9 (H) 4.8 - 10.8 K/uL    RBC 4.35 4.20 - 5.40 M/uL    Hemoglobin 11.3 (L) 12.0 - 16.0 g/dL    Hematocrit 35.9 (L) 37.0 - 47.0 %    MCV 82.5 81.4 - 97.8 fL    MCH 26.0 (L) 27.0 - 33.0 pg    MCHC 31.5 (L) 32.2 - 35.5 g/dL    RDW 42.8 35.9 - 50.0 fL    Platelet Count 324 164 - 446 K/uL    MPV 11.0 9.0 - 12.9 fL    Neutrophils-Polys 70.60 44.00 - 72.00 %    Lymphocytes 16.80 (L) 22.00 - 41.00 %    Monocytes 6.40 0.00 - 13.40 %    Eosinophils 1.20 0.00 - 6.90 %    Basophils 0.80 0.00 - 1.80 %    Immature Granulocytes 4.20 (H) 0.00 - 0.90 %    Nucleated RBC 0.00 0.00 - 0.20 /100 WBC    Neutrophils (Absolute) 9.09 (H) 1.82 - 7.42 K/uL    Lymphs (Absolute) 2.16 1.00 - 4.80 K/uL    Monos (Absolute) 0.83 0.00 - 0.85 K/uL    Eos (Absolute) 0.16 0.00 - 0.51 K/uL    Baso (Absolute) 0.10 0.00 - 0.12 K/uL    Immature Granulocytes (abs) 0.54 (H) 0.00 - 0.11 K/uL    NRBC (Absolute) 0.00 K/uL   T.PALLIDUM AB ADDIS (Syphilis)    Collection Time: 05/22/25 11:00 AM   Result Value Ref Range    Syphilis, Treponemal Qual Non-Reactive Non-Reactive   CBC without differential- Once in AM regardless of delivery time    Collection Time: 05/24/25  4:28 AM   Result  Value Ref Range    WBC 19.4 (H) 4.8 - 10.8 K/uL    RBC 3.49 (L) 4.20 - 5.40 M/uL    Hemoglobin 9.3 (L) 12.0 - 16.0 g/dL    Hematocrit 29.0 (L) 37.0 - 47.0 %    MCV 83.1 81.4 - 97.8 fL    MCH 26.6 (L) 27.0 - 33.0 pg    MCHC 32.1 (L) 32.2 - 35.5 g/dL    RDW 45.8 35.9 - 50.0 fL    Platelet Count 254 164 - 446 K/uL    MPV 11.0 9.0 - 12.9 fL         Intake/Output Summary (Last 24 hours) at 2025 0642  Last data filed at 2025 0415  Gross per 24 hour   Intake 887.53 ml   Output 1725 ml   Net -837.47 ml       Assessment and Plan:   Crystal rGewal is a 24 y.o. now  on post-op day #1 following primary  delivery due to arrest of descent, chorioamnionitis.    #postpartum  - Doing well postpartum, meeting all postpartum milestones  - encouraged ambulation, cont routine postop care  - continue prenatal vitamins    # delivery  - incision check in clinic in 1-2 weeks    #chorioamnionitis  - Pt afebrile for more than 24 hours  - received ampicillin and clindamycin in labor and post-operatively  - abx discontinued today    #Acute blood loss anemia  - Continue iron, prenatals    #Rh pos  - Rhogam not indicated    #Rubella immune    #HIV neg    #contraception:  - undecided    # ppx:   - SCDs  - Early ambulation    Disposition: Anticipate discharge to home on POD 2 or 3    NICOLE Langston, DO  PGY 1  [facility]         [1]   Current Facility-Administered Medications:     ampicillin (Omnipen) 2,000 mg in  mL IVPB, 2,000 mg, Intravenous, Q6HR, HALIE Francisco, Stopped at 25 0329    MD Alert...Gentamicin per Pharmacy, , Other, PHARMACY TO DOSE, HAILE Francisco    D5LR infusion, , Intravenous, Continuous, Sherwin Chua D.O., Stopped at 25 1230    acetaminophen (Tylenol) tablet 1,000 mg, 1,000 mg, Oral, Q6HR, Candice Cummings M.D., 1,000 mg at 25 0536    ketorolac (Toradol) 15 MG/ML injection 15 mg, 15 mg, Intravenous, Q6HR, Candice Cummings M.D., 15 mg at  05/24/25 0229    oxyCODONE immediate-release (Roxicodone) tablet 5 mg, 5 mg, Oral, Q4HRS PRN, Candice Cummings M.D.    oxyCODONE immediate release (Roxicodone) tablet 10 mg, 10 mg, Oral, Q4HRS PRN, Candice Cummings M.D.    HYDROmorphone (Dilaudid) injection 0.2 mg, 0.2 mg, Intravenous, Q2HRS PRN, Candice Cummings M.D.    HYDROmorphone (Dilaudid) injection 0.4 mg, 0.4 mg, Intravenous, Q2HRS PRN, Candice Cummings M.D.    ePHEDrine injection 10 mg, 10 mg, Intravenous, Q5 MIN PRN, Candice Cummings M.D.    ondansetron (Zofran) syringe/vial injection 4 mg, 4 mg, Intravenous, Q6HRS PRN, Candice Cummings M.D.    diphenhydrAMINE (Benadryl) injection 12.5 mg, 12.5 mg, Intravenous, Q6HRS PRN, Candice Cummings M.D.    diphenhydrAMINE (Benadryl) injection 12.5 mg, 12.5 mg, Intravenous, Q6HRS PRN **OR** diphenhydrAMINE (Benadryl) injection 25 mg, 25 mg, Intravenous, Q6HRS PRN **OR** naloxone HCl (Narcan) 20 mg in  mL infusion, 0.4 mg/hr, Intravenous, Q6HRS PRN, Candice Cummings M.D.    lactated ringers infusion, 2,000 mL, Intravenous, PRN, John Damon M.D.    ibuprofen (Motrin) tablet 800 mg, 800 mg, Oral, Q8HRS **FOLLOWED BY** [START ON 5/27/2025] ibuprofen (Motrin) tablet 800 mg, 800 mg, Oral, Q8HRS PRN, John Damon M.D.    acetaminophen (Tylenol) tablet 1,000 mg, 1,000 mg, Oral, Q6HRS **FOLLOWED BY** [START ON 5/27/2025] acetaminophen (Tylenol) tablet 1,000 mg, 1,000 mg, Oral, Q6HRS PRN, John Damon M.D.    oxyCODONE immediate-release (Roxicodone) tablet 5 mg, 5 mg, Oral, Q4HRS PRN, John Damon M.D.    oxyCODONE immediate release (Roxicodone) tablet 10 mg, 10 mg, Oral, Q4HRS PRN, John Damon M.D.    ondansetron (Zofran) syringe/vial injection 4 mg, 4 mg, Intravenous, Q6HRS PRN **OR** ondansetron (Zofran ODT) dispertab 4 mg, 4 mg, Oral, Q6HRS PRN, John Damon M.D.    diphenhydrAMINE (Benadryl) tablet/capsule 25 mg, 25 mg, Oral, Q6HRS PRN **OR** diphenhydrAMINE (Benadryl) injection  25 mg, 25 mg, Intravenous, Q6HRS PRN, John Damon M.D.    docusate sodium (Colace) capsule 100 mg, 100 mg, Oral, BID PRN, John Damon M.D.    bisacodyl (Dulcolax) suppository 10 mg, 10 mg, Rectal, PRN, John Damon M.D.    magnesium hydroxide (Milk Of Magnesia) suspension 30 mL, 30 mL, Oral, Q6HRS PRN, John Damon M.D.    prenatal plus vitamin (Stuartnatal 1+1) 27-1 MG tablet 1 Tablet, 1 Tablet, Oral, Daily-0800, John Damon M.D.    simethicone (Mylicon) chewable tablet 125 mg, 125 mg, Oral, 4X/DAY PRN, John Damon M.D.    calcium carbonate (Tums) chewable tab 1,000 mg, 1,000 mg, Oral, Q6HRS PRN, John Damon M.D.    clindamycin (Cleocin) IVPB premix 900 mg, 900 mg, Intravenous, Q8HRS, John Damon M.D., Stopped at 25 0114    [] oxytocin (Pitocin) infusion bolus (for post delivery), 20 Units, Intravenous, Once, Held at 25 1215 **FOLLOWED BY** oxytocin (Pitocin) infusion (for post delivery), 125 mL/hr, Intravenous, Continuous, Joanne Cooper M.D., Last Rate: 125 mL/hr at 25 1453, 125 mL/hr at 25 1453    oxytocin (Pitocin) infusion (for induction), 0-20 ja-units/min, Intravenous, Continuous, Joanen Cooper M.D., Stopped at 25 1230    ropivacaine 0.2 % (Naropin) injection, , Epidural, Continuous, Christofer Peoples M.D., New Bag at 25 0717    calcium carbonate (Tums) chewable tab 1,000 mg, 1,000 mg, Oral, TID, Joanne Cooper M.D., 1,000 mg at 25 1957

## 2025-05-24 NOTE — CARE PLAN
The patient is Stable - Low risk of patient condition declining or worsening    Shift Goals  Clinical Goals: fundus firm, lochia WDL  Patient Goals: healthy mom healthy baby  Family Goals: adequate support of pt and baby    Progress made toward(s) clinical / shift goals:    Problem: Knowledge Deficit - Postpartum  Goal: Patient will verbalize and demonstrate understanding of self and infant care  Outcome: Progressing  Note: Patient is engaged in self care and infant care education. Patient actively utilizes knowledge to care for self and infant.     Problem: Altered Physiologic Condition  Goal: Patient physiologically stable as evidenced by normal lochia, palpable uterine involution and vitals within normal limits  Outcome: Progressing  Note: Fundus firm and midline. Lochia light, rubra. Vitals within defined limits       Patient is not progressing towards the following goals:

## 2025-05-24 NOTE — PROGRESS NOTES
Assumed care at 0700  Pt fundus firm. Fundus at umbilicus. Lochia light, rubra  [place] dressing c/d/I.   ABX discontinued. Pt afebrile  Pt ambulating and has voided w/o difficulty   feeding q3hr by breast/formula

## 2025-05-24 NOTE — PROGRESS NOTES
Report received from AM RN at 1900. Patient sitting up in bed. Fundus firm at umbilicus, lochia light. Pain 5/10. POC discussed with patient and SO at bedside. Call light within reach.

## 2025-05-25 PROCEDURE — A9270 NON-COVERED ITEM OR SERVICE: HCPCS | Performed by: OBSTETRICS & GYNECOLOGY

## 2025-05-25 PROCEDURE — 770002 HCHG ROOM/CARE - OB PRIVATE (112)

## 2025-05-25 PROCEDURE — 700102 HCHG RX REV CODE 250 W/ 637 OVERRIDE(OP): Performed by: OBSTETRICS & GYNECOLOGY

## 2025-05-25 RX ADMIN — ACETAMINOPHEN 1000 MG: 500 TABLET ORAL at 00:06

## 2025-05-25 RX ADMIN — IBUPROFEN 800 MG: 800 TABLET, FILM COATED ORAL at 06:17

## 2025-05-25 RX ADMIN — PRENATAL WITH FERROUS FUM AND FOLIC ACID 1 TABLET: 3080; 920; 120; 400; 22; 1.84; 3; 20; 10; 1; 12; 200; 27; 25; 2 TABLET ORAL at 08:22

## 2025-05-25 RX ADMIN — IBUPROFEN 800 MG: 800 TABLET, FILM COATED ORAL at 16:10

## 2025-05-25 RX ADMIN — ACETAMINOPHEN 1000 MG: 500 TABLET ORAL at 12:36

## 2025-05-25 RX ADMIN — ACETAMINOPHEN 1000 MG: 500 TABLET ORAL at 18:39

## 2025-05-25 RX ADMIN — ACETAMINOPHEN 1000 MG: 500 TABLET ORAL at 06:17

## 2025-05-25 ASSESSMENT — EDINBURGH POSTNATAL DEPRESSION SCALE (EPDS)
THINGS HAVE BEEN GETTING ON TOP OF ME: NO, I HAVE BEEN COPING AS WELL AS EVER
I HAVE BEEN ABLE TO LAUGH AND SEE THE FUNNY SIDE OF THINGS: AS MUCH AS I ALWAYS COULD
I HAVE LOOKED FORWARD WITH ENJOYMENT TO THINGS: AS MUCH AS I EVER DID
I HAVE BEEN SO UNHAPPY THAT I HAVE HAD DIFFICULTY SLEEPING: NOT AT ALL
I HAVE BEEN SO UNHAPPY THAT I HAVE BEEN CRYING: NO, NEVER
I HAVE BEEN ANXIOUS OR WORRIED FOR NO GOOD REASON: HARDLY EVER
I HAVE BLAMED MYSELF UNNECESSARILY WHEN THINGS WENT WRONG: NOT VERY OFTEN
THE THOUGHT OF HARMING MYSELF HAS OCCURRED TO ME: NEVER
I HAVE FELT SCARED OR PANICKY FOR NO GOOD REASON: NO, NOT MUCH
I HAVE FELT SAD OR MISERABLE: NO, NOT AT ALL

## 2025-05-25 ASSESSMENT — PAIN DESCRIPTION - PAIN TYPE
TYPE: ACUTE PAIN;SURGICAL PAIN
TYPE: SURGICAL PAIN
TYPE: ACUTE PAIN;SURGICAL PAIN
TYPE: SURGICAL PAIN

## 2025-05-25 NOTE — CARE PLAN
The patient is Stable - Low risk of patient condition declining or worsening    Shift Goals  Clinical Goals: pain control . incision clean and dry  Patient Goals: bond with infant  Family Goals: bond with infant    Progress made toward(s) clinical / shift goals:  vss taking care of self and baby independently. Lochia scant. States adequate pain relief with current meds    Patient is not progressing towards the following goals:

## 2025-05-25 NOTE — CARE PLAN
Problem: Altered Physiologic Condition  Goal: Patient physiologically stable as evidenced by normal lochia, palpable uterine involution and vitals within normal limits  Outcome: Progressing   The patient is Stable - Low risk of patient condition declining or worsening    Shift Goals  Clinical Goals: monitor fundus; pain control  Patient Goals: bond with infant  Family Goals: bond with infant    Progress made toward(s) clinical / shift goals:    Problem: Early Mobilization - Post Surgery  Goal: Early mobilization post surgery  Outcome: Progressing       Patient is not progressing towards the following goals:

## 2025-05-25 NOTE — PROGRESS NOTES
Report received at bedside. Sleeping. Entered briefly  . No distress noted.  0800 assessment done wdl. Denies needs at this time . Poc discussed

## 2025-05-25 NOTE — LACTATION NOTE
Follow up:      MOB continues to combo feed, declines assistance or questions regarding lactation.     Reviewed feeding cues, frequency and duration of feeds, skin to skin, clusterfeeding, shallow vs deep latch and waking to feed if last feed was 3.5hrs prior.      Education provided supply and demand on breastmilk establishment and maintenance.  Reviewed benefits of colostrum,  recognition of hunger cues, infant's stomach size,  colostrum, hand expression, and skin to skin benefits.     Plan: Recommended if bottle supplementation is desired by MOB, continue to offer breast first at least 8x in 24hrs, with no time limitations.  Reviewed supplemental feeding guidelines and paced bottle feeding.

## 2025-05-25 NOTE — PROGRESS NOTES
Assumed care of patient, report at bedside from,  Tiffanie SEYMOUR. Assessment completed and WDL. Call light within reach, discussed plan of care, denies pain at the moment.

## 2025-05-25 NOTE — PROGRESS NOTES
Obstetrics & Gynecology Post-Delivery Progress Note    Date of Service      24 y.o.  s/p  for arrest of descent  Delivery date: 2025    Events  No events    Subjective  Pain: Yes,  location incision and controlled  Bleeding: lochia minimal  PO's: taking regular diet  Voiding: without difficulty  Ambulating: yes  Passing flatus: Yes  Feeding: breastfeeding well    Objective  Temp:  [36.4 °C (97.6 °F)-37.1 °C (98.8 °F)] 36.4 °C (97.6 °F)  Pulse:  [74-99] 74  Resp:  [16-18] 17  BP: ()/(64-78) 127/77  SpO2:  [96 %-98 %] 96 %    Physical Exam  General: well and resting  Chest/Breasts: nipples intact   Abdomen: normal bowel sounds  Fundus: firm and below umbilicus  Incision: dressing clean, dry, intact  Perineum: deferred  Extremities: symmetric, calves nontender    Recent Labs     25  1100 25  0428   WBC 12.9* 19.4*   RBC 4.35 3.49*   HEMOGLOBIN 11.3* 9.3*   HEMATOCRIT 35.9* 29.0*   MCV 82.5 83.1   MCH 26.0* 26.6*   RDW 42.8 45.8   PLATELETCT 324 254   MPV 11.0 11.0   NEUTSPOLYS 70.60  --    LYMPHOCYTES 16.80*  --    MONOCYTES 6.40  --    EOSINOPHILS 1.20  --    BASOPHILS 0.80  --        Assessment/Plan  Crystal Grewal is a 24 y.o.yo  s/p postop day #2  s/p c/s    1. Post care: meeting all goals  2. Hemodynamics: stable and start ferrous sulfate  3. Pain: controlled  4. Rh+, Rubella Immune  5. Method of Feeding: plans to breastfeed  6. Method of Contraception: not assessed  7. Disposition: likely home postop day 3    VTE prophylaxis: none indicated

## 2025-05-26 ENCOUNTER — PHARMACY VISIT (OUTPATIENT)
Dept: PHARMACY | Facility: MEDICAL CENTER | Age: 25
End: 2025-05-26
Payer: COMMERCIAL

## 2025-05-26 VITALS
WEIGHT: 215 LBS | RESPIRATION RATE: 18 BRPM | OXYGEN SATURATION: 96 % | DIASTOLIC BLOOD PRESSURE: 78 MMHG | BODY MASS INDEX: 34.55 KG/M2 | HEART RATE: 81 BPM | HEIGHT: 66 IN | TEMPERATURE: 97.9 F | SYSTOLIC BLOOD PRESSURE: 122 MMHG

## 2025-05-26 PROBLEM — O41.1230 CHORIOAMNIONITIS IN THIRD TRIMESTER: Status: ACTIVE | Noted: 2025-05-26

## 2025-05-26 PROCEDURE — A9270 NON-COVERED ITEM OR SERVICE: HCPCS | Performed by: OBSTETRICS & GYNECOLOGY

## 2025-05-26 PROCEDURE — RXMED WILLOW AMBULATORY MEDICATION CHARGE: Performed by: FAMILY MEDICINE

## 2025-05-26 PROCEDURE — 700102 HCHG RX REV CODE 250 W/ 637 OVERRIDE(OP): Performed by: OBSTETRICS & GYNECOLOGY

## 2025-05-26 RX ORDER — ASCORBIC ACID 500 MG
500 TABLET ORAL
Qty: 90 TABLET | Refills: 1 | Status: SHIPPED | OUTPATIENT
Start: 2025-05-26

## 2025-05-26 RX ORDER — MEDROXYPROGESTERONE ACETATE 150 MG/ML
150 INJECTION, SUSPENSION INTRAMUSCULAR ONCE
Status: DISCONTINUED | OUTPATIENT
Start: 2025-05-26 | End: 2025-05-26 | Stop reason: HOSPADM

## 2025-05-26 RX ORDER — ACETAMINOPHEN 500 MG
500-1000 TABLET ORAL EVERY 6 HOURS PRN
Qty: 60 TABLET | Refills: 0 | Status: SHIPPED | OUTPATIENT
Start: 2025-05-26

## 2025-05-26 RX ORDER — POLYETHYLENE GLYCOL 3350 17 G/17G
17 POWDER, FOR SOLUTION ORAL DAILY
Qty: 510 G | Refills: 3 | Status: SHIPPED | OUTPATIENT
Start: 2025-05-27

## 2025-05-26 RX ORDER — ENOXAPARIN SODIUM 100 MG/ML
60 INJECTION SUBCUTANEOUS DAILY
Qty: 39 EACH | Refills: 0 | Status: ACTIVE | OUTPATIENT
Start: 2025-05-26 | End: 2025-07-04

## 2025-05-26 RX ORDER — OXYCODONE HYDROCHLORIDE 5 MG/1
5 TABLET ORAL EVERY 6 HOURS PRN
Qty: 20 TABLET | Refills: 0 | Status: SHIPPED | OUTPATIENT
Start: 2025-05-26 | End: 2025-06-02

## 2025-05-26 RX ORDER — ENOXAPARIN SODIUM 100 MG/ML
60 INJECTION SUBCUTANEOUS DAILY
Status: DISCONTINUED | OUTPATIENT
Start: 2025-05-26 | End: 2025-05-26 | Stop reason: HOSPADM

## 2025-05-26 RX ORDER — IBUPROFEN 800 MG/1
800 TABLET, FILM COATED ORAL EVERY 8 HOURS PRN
Qty: 30 TABLET | Refills: 0 | Status: SHIPPED | OUTPATIENT
Start: 2025-05-26

## 2025-05-26 RX ORDER — POLYETHYLENE GLYCOL 3350 17 G/17G
1 POWDER, FOR SOLUTION ORAL DAILY
Status: DISCONTINUED | OUTPATIENT
Start: 2025-05-26 | End: 2025-05-26 | Stop reason: HOSPADM

## 2025-05-26 RX ORDER — FERROUS SULFATE 325(65) MG
325 TABLET ORAL
Qty: 90 TABLET | Refills: 1 | Status: SHIPPED | OUTPATIENT
Start: 2025-05-26

## 2025-05-26 RX ADMIN — IBUPROFEN 800 MG: 800 TABLET, FILM COATED ORAL at 00:41

## 2025-05-26 RX ADMIN — ACETAMINOPHEN 1000 MG: 500 TABLET ORAL at 11:23

## 2025-05-26 RX ADMIN — ACETAMINOPHEN 1000 MG: 500 TABLET ORAL at 00:42

## 2025-05-26 RX ADMIN — OXYCODONE 5 MG: 5 TABLET ORAL at 11:54

## 2025-05-26 RX ADMIN — OXYCODONE 5 MG: 5 TABLET ORAL at 02:39

## 2025-05-26 ASSESSMENT — PAIN DESCRIPTION - PAIN TYPE
TYPE: SURGICAL PAIN
TYPE: ACUTE PAIN;SURGICAL PAIN

## 2025-05-26 NOTE — DISCHARGE INSTRUCTIONS

## 2025-05-26 NOTE — PROGRESS NOTES
1000- patient assessment done.  Condition will continue to be monitored.     1330- patient states that she understands all discharge instructions and has no questions at this time.

## 2025-05-26 NOTE — LACTATION NOTE
This note was copied from a baby's chart.  Lactation follow-up visit    MOB chooses to provide mixed feeds, breast & bottle (mostly bottle). Baby's weight loss 2.62%, couplet to be discharged today. Supplemental Guidelines 10-20-30 given with review, educated to breastfeed first (both breasts) then offer formula according to Guideline volumes.     Educated to feed baby with feeding cues and at least a minimum of 8x/24 hours.  Expect cluster feeding as this is normal during early days of life and growth spurts.  It is not recommended to let baby sleep longer than 4 hours between feedings and if sleepy, place skin to skin to promote feeding interest and milk production.  Baby's usually feed more frequently and longer when skin to skin with mother. May offer formula after breastfeeding according to guidelines 10-20-30.    MOB has [place] [facility], mother plans to follow-up with WIC once discharged to home.     Feeding plan:  Breastfeed on cue a minimum 8 or more times in 24 hours no longer than 3 hours from last feed. May offer formula after breastfeeding according to guideline volumes.

## 2025-05-26 NOTE — DISCHARGE SUMMARY
[facility]  Obstetrics Discharge Summary    Date of Admission: 2025  Date of Discharge: 25    Admitting diagnosis:    1. Pregnancy at 41w1d  2. Induction of labor for late dates  3. Obesity, BMI 35    Discharge Diagnosis:   1. Status post  for failure to progress.  2. Intra-amniotic infection  3. Acute blood loss anemia  4. Same as above    Hospital Course:   Pt is 24 y.o. now  who presented on 2025 for induction of labor for late dates.   Labor induction and augmentation performed with pitocin. Patient did not progress in cervical dilation and decision was made for  delivery.  Epidural anesthesia was utilized with good effect on pain.   Postpartum course was unremarkable and patient has met all postpartum milestones.  Patient had early ambulation, well managed pain, tolerance of diet, spontaneous voiding, and appropriate feeding of infant.   She has remained afebrile and blood pressure has been well controlled.   All maternal questions and concerns addressed.    Single female infant was delivered via  delivery on 25 at 1329 with APGARs 8 and 8 at 1 and 5 minutes respectively.    ml    Contraception: Depo today prior to discharge, then IUD in clinic at postpartum appointment    PHYSICAL EXAM:  Temp:  [36.6 °C (97.9 °F)-37 °C (98.6 °F)] 36.6 °C (97.9 °F)  Pulse:  [61-81] 81  Resp:  [18] 18  BP: (112-122)/(72-78) 122/78  SpO2:  [96 %-97 %] 96 %    GEN: well appearing, no apparent distress  CV: +S1S2, RRR, trace BLE edema  RESP: CTAB, breathing comfortably on RA  ABD: soft, non-tender, non-distended, +BS  Fundus: firm below level of umbilicus  Incision: Mepliex in place  Perineum: Deferred  Extremities: symmetric, calves nontender    HISTORY:  Problem List[1]   Past Medical History[2]  OB History    Para Term  AB Living   1 1 1   1   SAB IAB Ectopic Molar Multiple Live Births       0 1      # Outcome Date GA Lbr Pardeep/2nd Weight Sex Type  Anes PTL Lv   1 Term 05/23/25 41w1d 18:52 / 06:16 3.25 kg (7 lb 2.6 oz) F CS-LTranv EPI, Spinal N PRASAD      Complications: Failure to Progress in Second Stage     Past Surgical History[3]  Allergies[4]   Current Facility-Administered Medications   Medication Dose    enoxaparin (Lovenox) inj 60 mg  60 mg    polyethylene glycol/lytes (Miralax) Packet 1 Packet  1 Packet    medroxyPROGESTERone (Depo-Provera) injection 150 mg  150 mg    lactated ringers infusion  2,000 mL    ibuprofen (Motrin) tablet 800 mg  800 mg    Followed by    [START ON 5/27/2025] ibuprofen (Motrin) tablet 800 mg  800 mg    acetaminophen (Tylenol) tablet 1,000 mg  1,000 mg    Followed by    [START ON 5/27/2025] acetaminophen (Tylenol) tablet 1,000 mg  1,000 mg    oxyCODONE immediate-release (Roxicodone) tablet 5 mg  5 mg    oxyCODONE immediate release (Roxicodone) tablet 10 mg  10 mg    ondansetron (Zofran) syringe/vial injection 4 mg  4 mg    Or    ondansetron (Zofran ODT) dispertab 4 mg  4 mg    diphenhydrAMINE (Benadryl) tablet/capsule 25 mg  25 mg    Or    diphenhydrAMINE (Benadryl) injection 25 mg  25 mg    magnesium hydroxide (Milk Of Magnesia) suspension 30 mL  30 mL    prenatal plus vitamin (Stuartnatal 1+1) 27-1 MG tablet 1 Tablet  1 Tablet    simethicone (Mylicon) chewable tablet 125 mg  125 mg    calcium carbonate (Tums) chewable tab 1,000 mg  1,000 mg    oxytocin (Pitocin) infusion (for post delivery)  125 mL/hr     Recent Labs     05/24/25  0428   WBC 19.4*   RBC 3.49*   HEMOGLOBIN 9.3*   HEMATOCRIT 29.0*   MCV 83.1   MCH 26.6*   MCHC 32.1*   RDW 45.8   PLATELETCT 254   MPV 11.0       Discharge Meds:   Current Outpatient Medications   Medication Sig Dispense Refill    acetaminophen (TYLENOL) 500 MG Tab Take 1-2 Tablets by mouth every 6 hours as needed for Mild Pain or Moderate Pain. 60 Tablet 0    enoxaparin (LOVENOX) 60 MG/0.6ML Solution Prefilled Syringe inj Inject 60 mg under the skin every day at 6 PM for 39 days. 39 Each 0     ibuprofen (MOTRIN) 800 MG Tab Take 1 Tablet by mouth every 8 hours as needed for Moderate Pain. 30 Tablet 0    oxyCODONE immediate-release (ROXICODONE) 5 MG Tab Take 1 Tablet by mouth every 6 hours as needed for Severe Pain for up to 7 days. 20 Tablet 0    [START ON 2025] polyethylene glycol 3350 (MIRALAX) 17 GM/SCOOP Powder Mix 17 grams with liquid and drink by mouth every day. 510 g 3    ferrous sulfate 325 (65 Fe) MG tablet Take 1 Tablet by mouth every 48 hours. Take with ascorbic acid 90 Tablet 1    ascorbic acid (VITAMIN C) 500 MG tablet Take 1 Tablet by mouth every 48 hours. Take with iron. 90 Tablet 1       Activity/ Discharge Instructions:  Discharge to home  Pelvic Rest x 6 weeks - no tampons, no sex, no douching  No heavy lifting for four weeks  Call or come to ED for: heavy vaginal bleeding, fever >100.4, severe abdominal pain, severe headache, chest pain, shortness of breath, significant nausea or vomiting, incisional drainage, or other concerns.    Diet:  As tolerated. Additional 400 kcal per day to maintain milk supply. Drink plenty of fluids daily.  Continue prenatal vitamins for six months or as long as breastfeeding.  Continue iron and vitamin C every other day for six months or until anemia improves.     Follow up:    [facility] in one week for incision check for  delivery, and BP check  [facility] in 3-5 days for staple removal after  delivery.    Bobbi Solorzano MD, MPH         [1]   Patient Active Problem List  Diagnosis    Encounter for supervision of normal first pregnancy in third trimester    IUGR - *resolved*     delivery delivered    Chorioamnionitis in third trimester    BMI 35.0-35.9,adult   [2] History reviewed. No pertinent past medical history.  [3] History reviewed. No pertinent surgical history.  [4] No Known Allergies

## 2025-05-26 NOTE — PROGRESS NOTES
Received bedside report from LION Reyes. Patient in bed, declines pain at this time. Taking scheduled tylenol and motrin. Whiteboards updated, POC discussed. Call light within reach. Patient encouraged to call with any needs and or concerns.

## 2025-05-26 NOTE — CARE PLAN
The patient is Stable - Low risk of patient condition declining or worsening    Shift Goals  Clinical Goals: VSS, Fundus firm with light lochia  Patient Goals:   Family Goals:     Progress made toward(s) clinical / shift goals:  VSS. Fundus firm with minimal lochia. Patient educated on when to call RN.     Patient is not progressing towards the following goals:

## 2025-05-27 LAB — PATHOLOGY CONSULT NOTE: NORMAL

## 2025-06-05 ENCOUNTER — GYNECOLOGY VISIT (OUTPATIENT)
Dept: OBGYN | Facility: CLINIC | Age: 25
End: 2025-06-05
Payer: MEDICAID

## 2025-06-05 VITALS — BODY MASS INDEX: 31.15 KG/M2 | WEIGHT: 193 LBS | SYSTOLIC BLOOD PRESSURE: 94 MMHG | DIASTOLIC BLOOD PRESSURE: 70 MMHG

## 2025-06-05 PROBLEM — O36.5930 POOR FETAL GROWTH AFFECTING MANAGEMENT OF MOTHER IN THIRD TRIMESTER: Status: RESOLVED | Noted: 2025-03-11 | Resolved: 2025-06-05

## 2025-06-05 PROBLEM — Z34.03 ENCOUNTER FOR SUPERVISION OF NORMAL FIRST PREGNANCY IN THIRD TRIMESTER: Status: RESOLVED | Noted: 2024-11-13 | Resolved: 2025-06-05

## 2025-06-05 PROBLEM — O41.1230 CHORIOAMNIONITIS IN THIRD TRIMESTER: Status: RESOLVED | Noted: 2025-05-26 | Resolved: 2025-06-05

## 2025-06-05 PROCEDURE — 3074F SYST BP LT 130 MM HG: CPT

## 2025-06-05 PROCEDURE — 99024 POSTOP FOLLOW-UP VISIT: CPT

## 2025-06-05 PROCEDURE — 3078F DIAST BP <80 MM HG: CPT

## 2025-06-05 NOTE — PROGRESS NOTES
Subjective   Subjective:    Crystal Grewal is a 24 y.o. female who presents for c section incision evaluation 1.5 weeks following a primary  section. Her prenatal course was uncomplicated. Her postpartum course was complicated by uncomplicated. She denies dysuria, vaginal bleeding, odor, itching or breast problems. She is doing well. Eating a regular diet without difficulty. Bowel movement are Normal.  The patient is not having any pain. Spotting. Patient denies Incisional pain, drainage or redness.      Problem List     Patient Active Problem List    Diagnosis Date Noted    Postpartum care following  delivery 2025     delivery delivered 2025    BMI 35.0-35.9,adult 2025       Objective      Lab:  Recent Results (from the past 6 weeks)   HOLD BLOOD BANK SPECIMEN (NOT TESTED)    Collection Time: 25 11:00 AM   Result Value Ref Range    Holding Tube - Bb DONE    CBC with differential    Collection Time: 25 11:00 AM   Result Value Ref Range    WBC 12.9 (H) 4.8 - 10.8 K/uL    RBC 4.35 4.20 - 5.40 M/uL    Hemoglobin 11.3 (L) 12.0 - 16.0 g/dL    Hematocrit 35.9 (L) 37.0 - 47.0 %    MCV 82.5 81.4 - 97.8 fL    MCH 26.0 (L) 27.0 - 33.0 pg    MCHC 31.5 (L) 32.2 - 35.5 g/dL    RDW 42.8 35.9 - 50.0 fL    Platelet Count 324 164 - 446 K/uL    MPV 11.0 9.0 - 12.9 fL    Neutrophils-Polys 70.60 44.00 - 72.00 %    Lymphocytes 16.80 (L) 22.00 - 41.00 %    Monocytes 6.40 0.00 - 13.40 %    Eosinophils 1.20 0.00 - 6.90 %    Basophils 0.80 0.00 - 1.80 %    Immature Granulocytes 4.20 (H) 0.00 - 0.90 %    Nucleated RBC 0.00 0.00 - 0.20 /100 WBC    Neutrophils (Absolute) 9.09 (H) 1.82 - 7.42 K/uL    Lymphs (Absolute) 2.16 1.00 - 4.80 K/uL    Monos (Absolute) 0.83 0.00 - 0.85 K/uL    Eos (Absolute) 0.16 0.00 - 0.51 K/uL    Baso (Absolute) 0.10 0.00 - 0.12 K/uL    Immature Granulocytes (abs) 0.54 (H) 0.00 - 0.11 K/uL    NRBC (Absolute) 0.00 K/uL   T.PALLIDUM AB ADDIS (Syphilis)     Collection Time: 25 11:00 AM   Result Value Ref Range    Syphilis, Treponemal Qual Non-Reactive Non-Reactive   Histology Request    Collection Time: 25  1:29 PM   Result Value Ref Range    Pathology Request Sent to Histo    CBC without differential- Once in AM regardless of delivery time    Collection Time: 25  4:28 AM   Result Value Ref Range    WBC 19.4 (H) 4.8 - 10.8 K/uL    RBC 3.49 (L) 4.20 - 5.40 M/uL    Hemoglobin 9.3 (L) 12.0 - 16.0 g/dL    Hematocrit 29.0 (L) 37.0 - 47.0 %    MCV 83.1 81.4 - 97.8 fL    MCH 26.6 (L) 27.0 - 33.0 pg    MCHC 32.1 (L) 32.2 - 35.5 g/dL    RDW 45.8 35.9 - 50.0 fL    Platelet Count 254 164 - 446 K/uL    MPV 11.0 9.0 - 12.9 fL     Vitals:    25 0957   Weight: 193 lb     There were no vitals filed for this visit.  Objective    Well nourished female in no acute distress  A& O x 3  Heart RRR  LCTAB  No edema noted and pulses WNL bilaterally in lower extremities; no claudication  BS x 4; no guarding or tenderness  Breasts: no erythema or discharge. No masses or tenderness.   Abdomen: well healing, well approximated incision noted extending horizontally at suprapubic arch above pubic hair. No erythema or purulent drainage. Steristrips removed.      Assessment   Assessment:    1. PP care following  delivery    Plan   Plan:    1. Discussed continued care of incision. Patient is educated on reasons to seek immediate attention including fever, incisional pain, significant bleeding or if wound appears open. General hygiene reviewed with patient. All questions answered.   2. F/U [facility] 4 weeks

## 2025-06-30 ENCOUNTER — POST PARTUM (OUTPATIENT)
Dept: OBGYN | Facility: CLINIC | Age: 25
End: 2025-06-30
Payer: MEDICAID

## 2025-06-30 VITALS — WEIGHT: 196.4 LBS | DIASTOLIC BLOOD PRESSURE: 68 MMHG | SYSTOLIC BLOOD PRESSURE: 90 MMHG | BODY MASS INDEX: 31.7 KG/M2

## 2025-06-30 ASSESSMENT — EDINBURGH POSTNATAL DEPRESSION SCALE (EPDS)
THINGS HAVE BEEN GETTING ON TOP OF ME: NO, MOST OF THE TIME I HAVE COPED QUITE WELL
I HAVE BEEN SO UNHAPPY THAT I HAVE BEEN CRYING: NO, NEVER
I HAVE BEEN ABLE TO LAUGH AND SEE THE FUNNY SIDE OF THINGS: AS MUCH AS I ALWAYS COULD
I HAVE BEEN ANXIOUS OR WORRIED FOR NO GOOD REASON: NO, NOT AT ALL
I HAVE LOOKED FORWARD WITH ENJOYMENT TO THINGS: AS MUCH AS I EVER DID
I HAVE FELT SCARED OR PANICKY FOR NO GOOD REASON: NO, NOT AT ALL
I HAVE BLAMED MYSELF UNNECESSARILY WHEN THINGS WENT WRONG: NO, NEVER
I HAVE BEEN SO UNHAPPY THAT I HAVE HAD DIFFICULTY SLEEPING: NOT AT ALL
TOTAL SCORE: 1
THE THOUGHT OF HARMING MYSELF HAS OCCURRED TO ME: NEVER
I HAVE FELT SAD OR MISERABLE: NO, NOT AT ALL

## 2025-06-30 ASSESSMENT — ENCOUNTER SYMPTOMS
WEAKNESS: 1
DEPRESSION: 0
NERVOUS/ANXIOUS: 0

## 2025-06-30 ASSESSMENT — LIFESTYLE VARIABLES: SUBSTANCE_ABUSE: 0

## 2025-06-30 NOTE — PROGRESS NOTES
Subjective     Crystal Grewal is a 24 y.o. female who presents with Postpartum visit today. Pt is 5 weeks s/p term primary C/S for FTP, chorio and OP position without complications. Pt has no complaints- denies heavy vaginal bleeding, depression, intercourse, pain or problems with BF. PAP wnl 2024 in [place] - no records, so will repeat when pt had IUD placed in 1-2, after prior auth from insurance. BCM desired is IUD - pt was asking for Paragard, as she was afraid of hormonal side effects, but after discussing options, pt may want Mirena instead.             HPI    Review of Systems   Genitourinary:  Negative for dysuria.   Neurological:  Positive for weakness (Some L leg/hip weakness).   Psychiatric/Behavioral:  Negative for depression and substance abuse. The patient is not nervous/anxious.    All other systems reviewed and are negative.             Objective     BP 90/68   Wt 196 lb 6.4 oz   LMP 08/08/2024 (Approximate)   BMI 31.70 kg/m²      Physical Exam  Vitals reviewed.   Constitutional:       Appearance: She is well-developed.   HENT:      Head: Normocephalic and atraumatic.   Eyes:      Pupils: Pupils are equal, round, and reactive to light.   Neck:      Thyroid: No thyromegaly.   Cardiovascular:      Rate and Rhythm: Normal rate and regular rhythm.      Heart sounds: Normal heart sounds.   Pulmonary:      Effort: Pulmonary effort is normal. No respiratory distress.      Breath sounds: Normal breath sounds.   Abdominal:      General: Bowel sounds are normal. There is no distension.      Palpations: Abdomen is soft.      Tenderness: There is no abdominal tenderness.          Comments: C/S incision C/D/I without erythema or induration   Genitourinary:     Exam position: Supine.      Labia:         Right: No rash or tenderness.         Left: No rash or tenderness.       Vagina: Normal. No signs of injury and foreign body. No vaginal discharge or erythema.      Cervix: No cervical motion tenderness.       Uterus: Normal. Not deviated, not enlarged and not tender.       Adnexa:         Right: No mass or tenderness.          Left: No mass or tenderness.        Comments: Anteverted uterus noted  Musculoskeletal:      Cervical back: Normal range of motion and neck supple.   Skin:     General: Skin is warm and dry.      Findings: No erythema.   Neurological:      Mental Status: She is alert.      Deep Tendon Reflexes: Reflexes are normal and symmetric.   Psychiatric:         Behavior: Behavior normal.         Thought Content: Thought content normal.            Assessment & Plan  Postpartum care following  delivery  - F/u 1 wk for PAP and IUD placement - likely Mirena desired, as insurance needs prior authorization  - PAP wnl  in [place] - repeat next visit  Orders:    Referral to Gynecology    Referral to Physical Therapy

## 2025-06-30 NOTE — PROGRESS NOTES
Pt here today for postpartum exam,delivery type 5/23/2025 Primary C/S due to FTP  Currently both breast and bottle feeding.   BCM: Pt would like IUD paragard.  Good ph: [phone]  Last pap smear 2024 per pt in [place] WNL, pt would like to get pap smear done in our clinic to have record of but will do when she comes back for her IUD.  Pt states no complaints.   EPDS = 1

## 2025-06-30 NOTE — ASSESSMENT & PLAN NOTE
- F/u 1 wk for PAP and IUD placement - likely Mirena desired, as insurance needs prior authorization  - PAP wnl 2024 in [place] - repeat next visit  Orders:    Referral to Gynecology    Referral to Physical Therapy

## 2025-08-12 ENCOUNTER — GYNECOLOGY VISIT (OUTPATIENT)
Dept: OBGYN | Facility: CLINIC | Age: 25
End: 2025-08-12
Payer: MEDICAID

## 2025-08-12 ENCOUNTER — HOSPITAL ENCOUNTER (OUTPATIENT)
Facility: MEDICAL CENTER | Age: 25
End: 2025-08-12
Payer: MEDICAID

## 2025-08-12 VITALS
BODY MASS INDEX: 32.99 KG/M2 | SYSTOLIC BLOOD PRESSURE: 122 MMHG | HEIGHT: 65 IN | DIASTOLIC BLOOD PRESSURE: 74 MMHG | WEIGHT: 198 LBS

## 2025-08-12 DIAGNOSIS — Z12.4 CERVICAL CANCER SCREENING: ICD-10-CM

## 2025-08-12 DIAGNOSIS — Z30.430 ENCOUNTER FOR INSERTION OF MIRENA IUD: Primary | ICD-10-CM

## 2025-08-12 PROCEDURE — 87491 CHLMYD TRACH DNA AMP PROBE: CPT

## 2025-08-12 PROCEDURE — 88175 CYTOPATH C/V AUTO FLUID REDO: CPT

## 2025-08-12 PROCEDURE — 58300 INSERT INTRAUTERINE DEVICE: CPT | Performed by: OBSTETRICS & GYNECOLOGY

## 2025-08-12 PROCEDURE — 87591 N.GONORRHOEAE DNA AMP PROB: CPT

## 2025-08-15 LAB — THINPREP PAP, CYTOLOGY NL11781: NORMAL

## (undated) DEVICE — PAD GROUNDING PRE-JELLED - (50EA/PK)

## (undated) DEVICE — GLOVE BIOGEL SZ 6.5 SURGICAL PF LTX (50PR/BX 4BX/CA)

## (undated) DEVICE — PACK C-SECTION (2EA/CA)

## (undated) DEVICE — TRAY FOLEY CATHETER STATLOCK 16FR SURESTEP (10EA/CA)

## (undated) DEVICE — SUCTION INSTRUMENT YANKAUER BULBOUS TIP W/O VENT (50EA/CA)

## (undated) DEVICE — BAG SPONGE COUNT 10.25 X 32 - BLUE (250/CA)

## (undated) DEVICE — HEAD HOLDER JUNIOR/ADULT

## (undated) DEVICE — PACK ROOM TURNOVER L&D (12/CA)

## (undated) DEVICE — WRAP PROBE OXIMETER INFANT UNIVERSAL DESIGN TO FIT NEONATAL FOOT INFANT TOE OR PED FINGER (12EA/BX)

## (undated) DEVICE — PAD LAP STERILE 18 X 18 - (5/PK 40PK/CA)

## (undated) DEVICE — SET EXTENSION WITH 2 PORTS (48EA/CA) ***PART #2C8610 IS A SUBSTITUTE*****

## (undated) DEVICE — CHLORAPREP 3 ML APPLICATOR - (25/BX 4BX/CS 100/CS)

## (undated) DEVICE — WATER IRRIGATION STERILE 1000ML (12EA/CA)

## (undated) DEVICE — SLEEVE VASO DVT COMPRESSION CALF MED - (10PR/CA)

## (undated) DEVICE — SUTURE 0 VICRYL PLUS CT-1 - 36 INCH (36/BX)

## (undated) DEVICE — CATHETER IV NON-SAFETY 18 GA X 1 1/4 (50/BX 4BX/CA)

## (undated) DEVICE — BLANKET UNDERBODY FULL ACCES - (5/CA)

## (undated) DEVICE — CANISTER SUCTION 3000ML MECHANICAL FILTER AUTO SHUTOFF MEDI-VAC NONSTERILE LF DISP (40EA/CA)

## (undated) DEVICE — SUTUREABS02-0 CT1 27IN - (36EA/BX)

## (undated) DEVICE — TUBE CONNECT SUCTION CLEAR 120 X 1/4" (50EA/CA)"

## (undated) DEVICE — TRAY SPINAL ANESTHESIA NON-SAFETY (20/CA)

## (undated) DEVICE — CHLORAPREP 26 ML APPLICATOR - ORANGE TINT(25/CA)

## (undated) DEVICE — CANNULA O2 COMFORT SOFT EAR ADULT 7 FT TUBING (50/CA)

## (undated) DEVICE — SENSOR SPO2 NEO LNCS ADHESIVE (20/BX) SEE USER NOTES

## (undated) DEVICE — SOLUTION PLASMA-LYTE PH 7.4 INJ 1000ML (14EA/CA)

## (undated) DEVICE — KIT  I.V. START (100EA/CA)

## (undated) DEVICE — BLANKET STERILE CHICKIE FOR L&D (100/CA)

## (undated) DEVICE — SUT ABS 3-0 CTX 27IN PLN GUT - (36/BX)

## (undated) DEVICE — PLUMEPEN ULTRA 3/8 IN X 10 FT HOSE (20EA/CA)

## (undated) DEVICE — ELECTRODE RADIOLUCENT LF 3PK - RED DOT (3/PK 200PK/CA)

## (undated) DEVICE — SODIUM CHL IRRIGATION 0.9% 1000ML (12EA/CA)

## (undated) DEVICE — SENSOR SPO2 ADULT LNCS ADTX (20/BX) ORDER ITEM #19593